# Patient Record
Sex: FEMALE | Race: WHITE | NOT HISPANIC OR LATINO | Employment: OTHER | URBAN - NONMETROPOLITAN AREA
[De-identification: names, ages, dates, MRNs, and addresses within clinical notes are randomized per-mention and may not be internally consistent; named-entity substitution may affect disease eponyms.]

---

## 2017-09-19 DIAGNOSIS — M85.80 OSTEOPENIA: Primary | ICD-10-CM

## 2017-09-22 DIAGNOSIS — Z78.0 POSTMENOPAUSAL: Primary | ICD-10-CM

## 2017-09-22 DIAGNOSIS — Z78.0 POSTMENOPAUSAL STATUS: Primary | ICD-10-CM

## 2017-09-27 ENCOUNTER — HOSPITAL ENCOUNTER (OUTPATIENT)
Dept: GENERAL RADIOLOGY | Facility: HOSPITAL | Age: 67
Discharge: HOME | End: 2017-09-27
Attending: FAMILY MEDICINE | Admitting: FAMILY MEDICINE
Payer: MEDICARE

## 2017-09-27 PROCEDURE — 77080 DXA BONE DENSITY AXIAL: CPT | Mod: TC

## 2019-11-19 NOTE — PROGRESS NOTES
Subjective     Gypsy Blackburn is a 69 year old female who presents to clinic today for the following health issues:    HPI   New Patient/Transfer of Care: Followed with Dr. Issa Up with last appointment in 9/19/2017  Hyperlipidemia Follow-Up      Are you having any of the following symptoms? (Select all that apply)  Left-sided neck or arm pain and Chest pain or pressure    Are you regularly taking any medication or supplement to lower your cholesterol?   Yes- red rice    Are you having muscle aches or other side effects that you think could be caused by your cholesterol lowering medication?  No    - currently having chest pain  - duration of couple of years  - Life Screening - blockage in her carotids  - stress test in 2013 which showed no reversible ischemia   - admitted to hospital for chest pain and was told she had a heart attack, but could not pinpoint when   - chest pain will wake her up at night   - pain goes into her left arm  - pain not worse with activity   - no leg pain  - FHx of heart issues w/ mother and brother (MI 50s)        How many servings of fruits and vegetables do you eat daily?  2-3    On average, how many sweetened beverages do you drink each day (soda, juice, sweet tea, etc)?   0    How many days per week do you miss taking your medication? 0    Diarrhea      Duration: 3 weeks, was sick. Took vitamin C tablet and resulting in diarrhea    Description:       Consistency of stool: watery, explosive and black       Blood in stool: no        Number of loose stools past 24 hours: None, no issues in the last 4 days.     Intensity:  severe    Accompanying signs and symptoms:       Fever: no        Nausea/vomitting: no        Abdominal pain: no        Weight loss: YES- only a pound or 2    History (recent antibiotics or travel/ill contacts/med changes/testing done): none    Precipitating or alleviating factors: None    Therapies tried and outcome: PeptoBismol DID NOT HELP  - doing BRAT diet  and increasing from there     Patient Active Problem List   Diagnosis     HCD (health care directive)     Family history of heart disease     Lumbago     Osteoarthrosis involving lower leg     Hyperlipidemia LDL goal <100     Plantar fasciitis     Past Surgical History:   Procedure Laterality Date     APPENDECTOMY  1964     ARTHROSCOPY KNEE Left 1989     HYSTERECTOMY  1974     HYSTERECTOMY TOTAL ABDOMINAL  2001     TONSILLECTOMY  1954       Social History     Tobacco Use     Smoking status: Never Smoker     Smokeless tobacco: Never Used   Substance Use Topics     Alcohol use: Not Currently     Comment: former     Family History   Problem Relation Age of Onset     Myocardial Infarction Mother      Alcoholism Father         Shot deer hunting     Mental Illness Brother      Myocardial Infarction Brother      Myocardial Infarction Brother      Myocardial Infarction Brother      Myocardial Infarction Brother          Current Outpatient Medications   Medication Sig Dispense Refill     aspirin (ASA) 81 MG EC tablet Take 81 mg by mouth       B Complex-Biotin-FA (SUPER B-COMPLEX) TABS        CALCIUM PO Take 600 mg by mouth       Cholecalciferol (VITAMIN D3) 50 MCG (2000 UT) CAPS Takes 1 once a day       magnesium 250 MG tablet Take 1 tablet by mouth daily       red yeast rice 600 MG CAPS Take 1,200 mg by mouth       EPINEPHrine (EPIPEN/ADRENACLICK/OR ANY BX GENERIC EQUIV) 0.3 MG/0.3ML injection 2-pack 1 TIME ONLY       Allergies   Allergen Reactions     Bee Venom      unknown reaction     Iodine      unknown reaction     Penicillins      Childhood     Etodolac Rash     BP Readings from Last 3 Encounters:   11/22/19 139/60   11/22/19 102/74    Wt Readings from Last 3 Encounters:   11/22/19 61.7 kg (136 lb)   11/22/19 62 kg (136 lb 9.6 oz)            Reviewed and updated as needed this visit by Provider  Tobacco  Allergies  Meds  Problems  Med Hx  Surg Hx  Fam Hx  Soc Hx          Review of Systems   Constitutional:  Negative for chills and fever.   HENT: Negative for congestion, ear pain, rhinorrhea and sore throat.    Eyes: Negative for pain.   Respiratory: Negative for cough and shortness of breath.    Cardiovascular: Positive for chest pain. Negative for palpitations.   Gastrointestinal: Negative for abdominal pain, constipation, diarrhea, hematochezia, nausea and vomiting.   Genitourinary: Negative for dysuria, frequency and hematuria.   Musculoskeletal: Negative for arthralgias, joint swelling and myalgias.   Skin: Negative for rash.   Neurological: Negative for dizziness, light-headedness and headaches.   Hematological: Does not bruise/bleed easily.   Psychiatric/Behavioral: Negative for mood changes. The patient is not nervous/anxious.          Objective    /74 (BP Location: Left arm, Patient Position: Sitting, Cuff Size: Adult Regular)   Pulse 72   Temp 97.8  F (36.6  C) (Tympanic)   Resp 20   Wt 62 kg (136 lb 9.6 oz)   SpO2 97%   There is no height or weight on file to calculate BMI.  Physical Exam  Constitutional:       General: She is not in acute distress.     Appearance: She is well-developed.   HENT:      Head: Normocephalic and atraumatic.      Right Ear: Hearing and tympanic membrane normal.      Left Ear: Hearing and tympanic membrane normal.      Mouth/Throat:      Mouth: Mucous membranes are moist.      Pharynx: No oropharyngeal exudate.   Eyes:      Extraocular Movements: Extraocular movements intact.      Pupils: Pupils are equal, round, and reactive to light.   Neck:      Musculoskeletal: Normal range of motion and neck supple.      Thyroid: No thyromegaly.   Cardiovascular:      Rate and Rhythm: Normal rate and regular rhythm.      Pulses: Normal pulses.      Heart sounds: Normal heart sounds. No murmur.   Pulmonary:      Effort: Pulmonary effort is normal. No respiratory distress.      Breath sounds: Normal breath sounds. No wheezing or rales.   Chest:      Chest wall: Tenderness (Upper left )  present.   Abdominal:      General: Bowel sounds are normal. There is no distension.      Palpations: Abdomen is soft.      Tenderness: There is abdominal tenderness in the epigastric area. There is no guarding.   Musculoskeletal: Normal range of motion.   Lymphadenopathy:      Cervical: No cervical adenopathy.   Skin:     General: Skin is dry.   Neurological:      Mental Status: She is alert.      Deep Tendon Reflexes: Reflexes normal.   Psychiatric:         Mood and Affect: Mood normal.        Diagnostic Test Results:  Results for orders placed or performed in visit on 11/22/19 (from the past 24 hour(s))   CBC with platelets differential   Result Value Ref Range    WBC 5.8 4.0 - 11.0 10e9/L    RBC Count 4.23 3.8 - 5.2 10e12/L    Hemoglobin 13.2 11.7 - 15.7 g/dL    Hematocrit 38.4 35.0 - 47.0 %    MCV 91 78 - 100 fl    MCH 31.2 26.5 - 33.0 pg    MCHC 34.4 31.5 - 36.5 g/dL    RDW 11.9 10.0 - 15.0 %    Platelet Count 270 150 - 450 10e9/L    Diff Method Automated Method     % Neutrophils 48.0 %    % Lymphocytes 38.0 %    % Monocytes 12.0 %    % Eosinophils 1.6 %    % Basophils 0.2 %    % Immature Granulocytes 0.2 %    Nucleated RBCs 0 0 /100    Absolute Neutrophil 2.8 1.6 - 8.3 10e9/L    Absolute Lymphocytes 2.2 0.8 - 5.3 10e9/L    Absolute Monocytes 0.7 0.0 - 1.3 10e9/L    Absolute Eosinophils 0.1 0.0 - 0.7 10e9/L    Absolute Basophils 0.0 0.0 - 0.2 10e9/L    Abs Immature Granulocytes 0.0 0 - 0.4 10e9/L    Absolute Nucleated RBC 0.0    Comprehensive metabolic panel   Result Value Ref Range    Sodium 141 133 - 144 mmol/L    Potassium 4.0 3.4 - 5.3 mmol/L    Chloride 107 94 - 109 mmol/L    Carbon Dioxide 29 20 - 32 mmol/L    Anion Gap 5 3 - 14 mmol/L    Glucose 92 70 - 99 mg/dL    Urea Nitrogen 16 7 - 30 mg/dL    Creatinine 0.97 0.52 - 1.04 mg/dL    GFR Estimate 60 (L) >60 mL/min/[1.73_m2]    GFR Estimate If Black 70 >60 mL/min/[1.73_m2]    Calcium 9.2 8.5 - 10.1 mg/dL    Bilirubin Total 0.7 0.2 - 1.3 mg/dL    Albumin  4.0 3.4 - 5.0 g/dL    Protein Total 6.8 6.8 - 8.8 g/dL    Alkaline Phosphatase 88 40 - 150 U/L    ALT 28 0 - 50 U/L    AST 20 0 - 45 U/L   TSH with free T4 reflex   Result Value Ref Range    TSH 1.81 0.40 - 4.00 mU/L   Troponin I   Result Value Ref Range    Troponin I ES <0.015 0.000 - 0.045 ug/L         Recent Labs   Lab Test 11/22/19  1220   CHOL 217*   HDL 49*   *   TRIG 91         EKG: LBBB, also on 9/17/2013 EKG read. Does not meet Sgarbossa criteria     Assessment & Plan     1. Chest pain, unspecified type: Consistent with unstable angina   Significant FHx of heart issues w/ brother MIs in their 50s. EKG showed LBBB which was also shown on EKG 9/2013. Pain wakes her up at night. Limited interactions with healthcare. High pretest probability. Troponin was negative, but high level of suspicion for cardiac etiology, will send to ER for further work up.   - EKG 12-lead complete w/read - (Clinic Performed)  - Troponin I  - CARDIOLOGY EVAL ADULT REFERRAL  - CXR in ED    2. Hyperlipidemia LDL goal <100  - Lipid Profile, ASCVD risk 4.8%    3. Diarrhea, unspecified type  Resolved. Gyspy is declining a colonoscopy. The dark stools may be d/t pepto bismol     4. Need for prophylactic vaccination and inoculation against influenza  - INFLUENZA (HIGH DOSE) 3 VALENT VACCINE [01190]  - ADMIN INFLUENZA (For MEDICARE Patients ONLY) []  - ADMIN PNEUMOVAX VACCINE (For MEDICARE Patients ONLY) []  - Vaccine Administration, Initial [18794]  - Pneumococcal vaccine 23 valent PPSV23  (Pneumovax) [77281]    5. Encounter for routine adult medical exam with abnormal findings  - CBC with platelets differential  - Comprehensive metabolic panel  - TSH with free T4 reflex    # Wellness:  - colonoscopy: declining colonoscopy, will do cologuard. Discussed if positive, will need to do a colonoscopy. Gypsy is in agreement   -  Mammogram: declines   - pap: aged out and hysterectomy     See Patient Instructions    Follow up in  3 months    Kaylen Hawkins MD  [facility] - [place]

## 2019-11-22 ENCOUNTER — OFFICE VISIT (OUTPATIENT)
Dept: FAMILY MEDICINE | Facility: OTHER | Age: 69
End: 2019-11-22
Attending: FAMILY MEDICINE
Payer: MEDICARE

## 2019-11-22 ENCOUNTER — HOSPITAL ENCOUNTER (EMERGENCY)
Facility: HOSPITAL | Age: 69
Discharge: HOME | End: 2019-11-22
Attending: PHYSICIAN ASSISTANT | Admitting: PHYSICIAN ASSISTANT
Payer: MEDICARE

## 2019-11-22 ENCOUNTER — APPOINTMENT (OUTPATIENT)
Dept: CT IMAGING | Facility: HOSPITAL | Age: 69
End: 2019-11-22
Attending: PHYSICIAN ASSISTANT
Payer: MEDICARE

## 2019-11-22 VITALS
TEMPERATURE: 98.4 F | RESPIRATION RATE: 18 BRPM | HEART RATE: 59 BPM | DIASTOLIC BLOOD PRESSURE: 90 MMHG | SYSTOLIC BLOOD PRESSURE: 108 MMHG | WEIGHT: 136 LBS | OXYGEN SATURATION: 97 %

## 2019-11-22 VITALS
DIASTOLIC BLOOD PRESSURE: 74 MMHG | TEMPERATURE: 97.8 F | SYSTOLIC BLOOD PRESSURE: 102 MMHG | WEIGHT: 136.6 LBS | OXYGEN SATURATION: 97 % | HEART RATE: 72 BPM | RESPIRATION RATE: 20 BRPM

## 2019-11-22 DIAGNOSIS — Z00.01 ENCOUNTER FOR ROUTINE ADULT MEDICAL EXAM WITH ABNORMAL FINDINGS: Primary | ICD-10-CM

## 2019-11-22 DIAGNOSIS — R19.7 DIARRHEA, UNSPECIFIED TYPE: ICD-10-CM

## 2019-11-22 DIAGNOSIS — E78.5 HYPERLIPIDEMIA LDL GOAL <100: ICD-10-CM

## 2019-11-22 DIAGNOSIS — R07.9 CHEST PAIN: ICD-10-CM

## 2019-11-22 DIAGNOSIS — Z23 NEED FOR PROPHYLACTIC VACCINATION AND INOCULATION AGAINST INFLUENZA: ICD-10-CM

## 2019-11-22 DIAGNOSIS — R07.9 CHEST PAIN, UNSPECIFIED TYPE: ICD-10-CM

## 2019-11-22 DIAGNOSIS — R07.89 ATYPICAL CHEST PAIN: ICD-10-CM

## 2019-11-22 LAB
ALBUMIN SERPL-MCNC: 4 G/DL (ref 3.4–5)
ALP SERPL-CCNC: 88 U/L (ref 40–150)
ALT SERPL W P-5'-P-CCNC: 28 U/L (ref 0–50)
ANION GAP SERPL CALCULATED.3IONS-SCNC: 5 MMOL/L (ref 3–14)
AST SERPL W P-5'-P-CCNC: 20 U/L (ref 0–45)
BASOPHILS # BLD AUTO: 0 10E9/L (ref 0–0.2)
BASOPHILS NFR BLD AUTO: 0.2 %
BILIRUB SERPL-MCNC: 0.7 MG/DL (ref 0.2–1.3)
BUN SERPL-MCNC: 16 MG/DL (ref 7–30)
CALCIUM SERPL-MCNC: 9.2 MG/DL (ref 8.5–10.1)
CHLORIDE SERPL-SCNC: 107 MMOL/L (ref 94–109)
CHOLEST SERPL-MCNC: 217 MG/DL
CO2 SERPL-SCNC: 29 MMOL/L (ref 20–32)
CREAT SERPL-MCNC: 0.97 MG/DL (ref 0.52–1.04)
DIFFERENTIAL METHOD BLD: NORMAL
EOSINOPHIL # BLD AUTO: 0.1 10E9/L (ref 0–0.7)
EOSINOPHIL NFR BLD AUTO: 1.6 %
ERYTHROCYTE [DISTWIDTH] IN BLOOD BY AUTOMATED COUNT: 11.9 % (ref 10–15)
GFR SERPL CREATININE-BSD FRML MDRD: 60 ML/MIN/{1.73_M2}
GLUCOSE SERPL-MCNC: 92 MG/DL (ref 70–99)
HCT VFR BLD AUTO: 38.4 % (ref 35–47)
HDLC SERPL-MCNC: 49 MG/DL
HGB BLD-MCNC: 13.2 G/DL (ref 11.7–15.7)
IMM GRANULOCYTES # BLD: 0 10E9/L (ref 0–0.4)
IMM GRANULOCYTES NFR BLD: 0.2 %
LDLC SERPL CALC-MCNC: 150 MG/DL
LYMPHOCYTES # BLD AUTO: 2.2 10E9/L (ref 0.8–5.3)
LYMPHOCYTES NFR BLD AUTO: 38 %
MCH RBC QN AUTO: 31.2 PG (ref 26.5–33)
MCHC RBC AUTO-ENTMCNC: 34.4 G/DL (ref 31.5–36.5)
MCV RBC AUTO: 91 FL (ref 78–100)
MONOCYTES # BLD AUTO: 0.7 10E9/L (ref 0–1.3)
MONOCYTES NFR BLD AUTO: 12 %
NEUTROPHILS # BLD AUTO: 2.8 10E9/L (ref 1.6–8.3)
NEUTROPHILS NFR BLD AUTO: 48 %
NONHDLC SERPL-MCNC: 168 MG/DL
NRBC # BLD AUTO: 0 10*3/UL
NRBC BLD AUTO-RTO: 0 /100
PLATELET # BLD AUTO: 270 10E9/L (ref 150–450)
POTASSIUM SERPL-SCNC: 4 MMOL/L (ref 3.4–5.3)
PROT SERPL-MCNC: 6.8 G/DL (ref 6.8–8.8)
RBC # BLD AUTO: 4.23 10E12/L (ref 3.8–5.2)
SODIUM SERPL-SCNC: 141 MMOL/L (ref 133–144)
TRIGL SERPL-MCNC: 91 MG/DL
TROPONIN I SERPL-MCNC: <0.015 UG/L (ref 0–0.04)
TROPONIN I SERPL-MCNC: <0.015 UG/L (ref 0–0.04)
TSH SERPL DL<=0.005 MIU/L-ACNC: 1.81 MU/L (ref 0.4–4)
WBC # BLD AUTO: 5.8 10E9/L (ref 4–11)

## 2019-11-22 PROCEDURE — 96374 THER/PROPH/DIAG INJ IV PUSH: CPT

## 2019-11-22 PROCEDURE — 84484 ASSAY OF TROPONIN QUANT: CPT | Mod: 91 | Performed by: PHYSICIAN ASSISTANT

## 2019-11-22 PROCEDURE — 99285 EMERGENCY DEPT VISIT HI MDM: CPT | Mod: 25

## 2019-11-22 PROCEDURE — 80061 LIPID PANEL: CPT | Mod: ZL | Performed by: FAMILY MEDICINE

## 2019-11-22 PROCEDURE — G0463 HOSPITAL OUTPT CLINIC VISIT: HCPCS | Mod: 25

## 2019-11-22 PROCEDURE — 90662 IIV NO PRSV INCREASED AG IM: CPT

## 2019-11-22 PROCEDURE — 90732 PPSV23 VACC 2 YRS+ SUBQ/IM: CPT

## 2019-11-22 PROCEDURE — G0009 ADMIN PNEUMOCOCCAL VACCINE: HCPCS | Performed by: FAMILY MEDICINE

## 2019-11-22 PROCEDURE — 84443 ASSAY THYROID STIM HORMONE: CPT | Mod: ZL | Performed by: FAMILY MEDICINE

## 2019-11-22 PROCEDURE — 84484 ASSAY OF TROPONIN QUANT: CPT | Mod: ZL | Performed by: FAMILY MEDICINE

## 2019-11-22 PROCEDURE — 80053 COMPREHEN METABOLIC PANEL: CPT | Mod: ZL | Performed by: FAMILY MEDICINE

## 2019-11-22 PROCEDURE — 85025 COMPLETE CBC W/AUTO DIFF WBC: CPT | Mod: ZL | Performed by: FAMILY MEDICINE

## 2019-11-22 PROCEDURE — 25500064 ZZH RX 255 OP 636: Performed by: RADIOLOGY

## 2019-11-22 PROCEDURE — 90471 IMMUNIZATION ADMIN: CPT | Performed by: FAMILY MEDICINE

## 2019-11-22 PROCEDURE — 93005 ELECTROCARDIOGRAM TRACING: CPT | Mod: XU

## 2019-11-22 PROCEDURE — 93010 ELECTROCARDIOGRAM REPORT: CPT | Performed by: INTERNAL MEDICINE

## 2019-11-22 PROCEDURE — 25000128 H RX IP 250 OP 636: Performed by: PHYSICIAN ASSISTANT

## 2019-11-22 PROCEDURE — 25000132 ZZH RX MED GY IP 250 OP 250 PS 637: Mod: GY

## 2019-11-22 PROCEDURE — 25000125 ZZHC RX 250: Performed by: PHYSICIAN ASSISTANT

## 2019-11-22 PROCEDURE — 99204 OFFICE O/P NEW MOD 45 MIN: CPT | Mod: 25 | Performed by: FAMILY MEDICINE

## 2019-11-22 PROCEDURE — 99284 EMERGENCY DEPT VISIT MOD MDM: CPT | Mod: Z6 | Performed by: PHYSICIAN ASSISTANT

## 2019-11-22 PROCEDURE — G0008 ADMIN INFLUENZA VIRUS VAC: HCPCS | Performed by: FAMILY MEDICINE

## 2019-11-22 PROCEDURE — 75574 CT ANGIO HRT W/3D IMAGE: CPT | Mod: TC

## 2019-11-22 PROCEDURE — 36415 COLL VENOUS BLD VENIPUNCTURE: CPT | Mod: ZL | Performed by: FAMILY MEDICINE

## 2019-11-22 PROCEDURE — 96375 TX/PRO/DX INJ NEW DRUG ADDON: CPT | Mod: XU

## 2019-11-22 RX ORDER — METOPROLOL TARTRATE 50 MG
50-100 TABLET ORAL
Status: CANCELLED | OUTPATIENT
Start: 2019-11-22

## 2019-11-22 RX ORDER — NITROGLYCERIN 0.4 MG/1
0.4 TABLET SUBLINGUAL
Status: DISCONTINUED | OUTPATIENT
Start: 2019-11-22 | End: 2019-11-22 | Stop reason: HOSPADM

## 2019-11-22 RX ORDER — NITROGLYCERIN 0.4 MG/1
0.4 TABLET SUBLINGUAL
Status: CANCELLED | OUTPATIENT
Start: 2019-11-22

## 2019-11-22 RX ORDER — METOPROLOL TARTRATE 1 MG/ML
5-15 INJECTION, SOLUTION INTRAVENOUS
Status: CANCELLED | OUTPATIENT
Start: 2019-11-22

## 2019-11-22 RX ORDER — METHYLPREDNISOLONE SODIUM SUCCINATE 125 MG/2ML
125 INJECTION, POWDER, LYOPHILIZED, FOR SOLUTION INTRAMUSCULAR; INTRAVENOUS ONCE
Status: COMPLETED | OUTPATIENT
Start: 2019-11-22 | End: 2019-11-22

## 2019-11-22 RX ORDER — DIPHENHYDRAMINE HYDROCHLORIDE 50 MG/ML
50 INJECTION INTRAMUSCULAR; INTRAVENOUS ONCE
Status: COMPLETED | OUTPATIENT
Start: 2019-11-22 | End: 2019-11-22

## 2019-11-22 RX ORDER — MULTIVITAMIN WITH IRON
1 TABLET ORAL DAILY
COMMUNITY
End: 2020-10-07

## 2019-11-22 RX ORDER — METOPROLOL TARTRATE 50 MG
50-100 TABLET ORAL
Status: DISCONTINUED | OUTPATIENT
Start: 2019-11-22 | End: 2019-11-22 | Stop reason: HOSPADM

## 2019-11-22 RX ORDER — EPINEPHRINE 0.3 MG/.3ML
INJECTION SUBCUTANEOUS
COMMUNITY
Start: 2014-09-23 | End: 2019-11-22

## 2019-11-22 RX ORDER — METOPROLOL TARTRATE 1 MG/ML
5 INJECTION, SOLUTION INTRAVENOUS ONCE
Status: COMPLETED | OUTPATIENT
Start: 2019-11-22 | End: 2019-11-22

## 2019-11-22 RX ORDER — NITROGLYCERIN 0.4 MG/1
TABLET SUBLINGUAL
Status: COMPLETED
Start: 2019-11-22 | End: 2019-11-22

## 2019-11-22 RX ORDER — METOPROLOL TARTRATE 1 MG/ML
5-15 INJECTION, SOLUTION INTRAVENOUS
Status: DISCONTINUED | OUTPATIENT
Start: 2019-11-22 | End: 2019-11-22 | Stop reason: HOSPADM

## 2019-11-22 RX ORDER — AMPICILLIN TRIHYDRATE 250 MG
600 CAPSULE ORAL DAILY
COMMUNITY
Start: 2013-12-05 | End: 2020-10-07

## 2019-11-22 RX ADMIN — DIPHENHYDRAMINE HYDROCHLORIDE 50 MG: 50 INJECTION, SOLUTION INTRAMUSCULAR; INTRAVENOUS at 12:28

## 2019-11-22 RX ADMIN — IOHEXOL 50 ML: 350 INJECTION, SOLUTION INTRAVENOUS at 13:38

## 2019-11-22 RX ADMIN — NITROGLYCERIN 0.4 MG: 0.4 TABLET SUBLINGUAL at 13:31

## 2019-11-22 RX ADMIN — METHYLPREDNISOLONE SODIUM SUCCINATE 125 MG: 125 INJECTION, POWDER, FOR SOLUTION INTRAMUSCULAR; INTRAVENOUS at 12:26

## 2019-11-22 RX ADMIN — METOPROLOL TARTRATE 5 MG: 1 INJECTION, SOLUTION INTRAVENOUS at 12:31

## 2019-11-22 ASSESSMENT — ENCOUNTER SYMPTOMS
CONSTIPATION: 0
RHINORRHEA: 0
NERVOUS/ANXIOUS: 0
FEVER: 0
BRUISES/BLEEDS EASILY: 0
APPETITE CHANGE: 0
DIZZINESS: 0
LIGHT-HEADEDNESS: 0
DIZZINESS: 0
COUGH: 0
CARDIOVASCULAR NEGATIVE: 1
FREQUENCY: 0
DYSURIA: 0
FATIGUE: 0
ABDOMINAL PAIN: 0
VOMITING: 0
CHILLS: 0
ACTIVITY CHANGE: 0
BRUISES/BLEEDS EASILY: 0
HEMATOCHEZIA: 0
BACK PAIN: 0
COUGH: 0
ARTHRALGIAS: 0
PALPITATIONS: 0
NAUSEA: 0
SORE THROAT: 0
EYE PAIN: 0
HEMATURIA: 0
LIGHT-HEADEDNESS: 0
SHORTNESS OF BREATH: 0
JOINT SWELLING: 0
SHORTNESS OF BREATH: 0
VOMITING: 0
ABDOMINAL PAIN: 0
DIARRHEA: 0
CHEST TIGHTNESS: 0
HEADACHES: 0
HEADACHES: 0
CHILLS: 0
FEVER: 0
NAUSEA: 0
MYALGIAS: 0
WEAKNESS: 0

## 2019-11-22 ASSESSMENT — ANXIETY QUESTIONNAIRES
GAD7 TOTAL SCORE: 7
6. BECOMING EASILY ANNOYED OR IRRITABLE: SEVERAL DAYS
3. WORRYING TOO MUCH ABOUT DIFFERENT THINGS: SEVERAL DAYS
7. FEELING AFRAID AS IF SOMETHING AWFUL MIGHT HAPPEN: SEVERAL DAYS
1. FEELING NERVOUS, ANXIOUS, OR ON EDGE: SEVERAL DAYS
2. NOT BEING ABLE TO STOP OR CONTROL WORRYING: SEVERAL DAYS
5. BEING SO RESTLESS THAT IT IS HARD TO SIT STILL: SEVERAL DAYS
IF YOU CHECKED OFF ANY PROBLEMS ON THIS QUESTIONNAIRE, HOW DIFFICULT HAVE THESE PROBLEMS MADE IT FOR YOU TO DO YOUR WORK, TAKE CARE OF THINGS AT HOME, OR GET ALONG WITH OTHER PEOPLE: SOMEWHAT DIFFICULT

## 2019-11-22 ASSESSMENT — PATIENT HEALTH QUESTIONNAIRE - PHQ9
SUM OF ALL RESPONSES TO PHQ QUESTIONS 1-9: 6
5. POOR APPETITE OR OVEREATING: SEVERAL DAYS

## 2019-11-22 ASSESSMENT — PAIN SCALES - GENERAL: PAINLEVEL: NO PAIN (0)

## 2019-11-22 NOTE — ED TRIAGE NOTES
"Pt states she has been having left chest pain off and on for months. States at times it radiates to left bicep \"it wakes me up at times\". States no radiation at this time. Chest pain 2/10 and doesn't change with respirations. States came from clinic after EKG changes.  "

## 2019-11-22 NOTE — ED PROVIDER NOTES
"  History     Chief Complaint   Patient presents with     Abnormal Ekg     Chest Pain     The history is provided by the patient.     Gypsy Blackburn is a 69 year old female who presented to the emergency department ambulatory from the clinic for evaluation of ongoing chest pain.  Patient was seen today to establish care and was noted to have at least 2 months of daily left anterior chest pain that worsened with palpation.  The pain would wake her up in the middle of the night and significantly worsened with \"pressing\" on the area.  Describes just left of the sternum with some radiation to the left bicep.  Sharp in nature.  Reports that she was out shoveling snow for 3-1/2 hours yesterday without any significant pain.  Denies any diaphoresis, nausea, or dyspnea with exertion.  She does have a strong family history of coronary artery disease.  The patient does not smoke.  She does not have diabetes.  She does not have elevated cholesterol.  She is not obese.  Describes that she has pain \"every single day.\"    Allergies:  Allergies   Allergen Reactions     Bee Venom      unknown reaction     Iodine      unknown reaction     Penicillins      Childhood     Etodolac Rash       Problem List:    Patient Active Problem List    Diagnosis Date Noted     Family history of heart disease 09/29/2015     Priority: Medium     Osteoarthrosis involving lower leg 09/29/2015     Priority: Medium     Overview:   Medial knee DJD  IMO Update       HCD (health care directive) 10/11/2013     Priority: Medium     Plantar fasciitis 08/29/2013     Priority: Medium     Hyperlipidemia LDL goal <100 11/23/2012     Priority: Medium     Lumbago 12/21/2006     Priority: Medium     Overview:   IMO Update 10/11          Past Medical History:    No past medical history on file.    Past Surgical History:    Past Surgical History:   Procedure Laterality Date     APPENDECTOMY  1964     ARTHROSCOPY KNEE Left 1989     HYSTERECTOMY  1974     HYSTERECTOMY " TOTAL ABDOMINAL  2001     TONSILLECTOMY  1954       Family History:    Family History   Problem Relation Age of Onset     Myocardial Infarction Mother      Alcoholism Father         Shot deer hunting     Mental Illness Brother      Myocardial Infarction Brother      Myocardial Infarction Brother      Myocardial Infarction Brother      Myocardial Infarction Brother        Social History:  Marital Status:   [4]  Social History     Tobacco Use     Smoking status: Never Smoker     Smokeless tobacco: Never Used   Substance Use Topics     Alcohol use: Not Currently     Comment: former     Drug use: Never        Medications:    aspirin (ASA) 81 MG EC tablet  B Complex-Biotin-FA (SUPER B-COMPLEX) TABS  CALCIUM PO  Cholecalciferol (VITAMIN D3) 50 MCG (2000 UT) CAPS  magnesium 250 MG tablet  red yeast rice 600 MG CAPS          Review of Systems   Constitutional: Negative for activity change, appetite change, chills, fatigue and fever.   Respiratory: Negative for cough, chest tightness and shortness of breath.    Cardiovascular: Negative.    Gastrointestinal: Negative for abdominal pain, nausea and vomiting.   Genitourinary: Negative.    Musculoskeletal: Negative for back pain.   Skin: Negative.    Neurological: Negative for dizziness, weakness, light-headedness and headaches.   Hematological: Does not bruise/bleed easily.       Physical Exam   BP: 139/60  Pulse: 73  Heart Rate: 71  Temp: 97.9  F (36.6  C)  Resp: 16  Weight: 61.7 kg (136 lb)  SpO2: 100 %      Physical Exam  Vitals signs and nursing note reviewed.   Constitutional:       General: She is not in acute distress.     Appearance: Normal appearance. She is normal weight. She is not ill-appearing, toxic-appearing or diaphoretic.   Neck:      Musculoskeletal: Normal range of motion and neck supple.   Cardiovascular:      Rate and Rhythm: Normal rate and regular rhythm.      Pulses: Normal pulses.      Heart sounds: Normal heart sounds.   Pulmonary:      Effort:  Pulmonary effort is normal.      Breath sounds: Normal breath sounds.   Abdominal:      General: Abdomen is flat.   Skin:     General: Skin is warm and dry.      Capillary Refill: Capillary refill takes less than 2 seconds.   Neurological:      General: No focal deficit present.      Mental Status: She is alert and oriented to person, place, and time. Mental status is at baseline.   Psychiatric:         Mood and Affect: Mood normal.         ED Course        Procedures  EKG shows a normal sinus rhythm at a rate of 62.  Normal ND interval.  Prolonged QRS duration of 150 ms.  Normal QTC.  There is a left bundle.  I could not visualize the EKG from 2013 but dictated note shows left bundle branch block at that time.  Normal discordance.             Critical Care time:  none               Results for orders placed or performed during the hospital encounter of 11/22/19 (from the past 24 hour(s))   Troponin I   Result Value Ref Range    Troponin I ES <0.015 0.000 - 0.045 ug/L   CTA Angiogram coronary artery    Narrative    PROCEDURE: CTA ANGIOGRAM CORONARY ARTERY 11/22/2019 2:55 PM    HISTORY: Chest pain    COMPARISONS: None.    Meds/Dose Given: Omnipaque 350 100mL    TECHNIQUE: CT scan of the heart without contrast with calcium scoring  CT coronary angiogram with three-dimensional MIPS reconstructions  performed on a separate workstation.    FINDINGS: CT coronary calcium scoring was performed. Using the  Agatston method the CT calcium score was 267 place the patient at  moderate risk of significant coronary artery disease.    CT coronary angiogram was performed. The right coronary artery and  marginal branches appear normal. There is atherosclerotic plaquing at  the left main coronary artery without significant stenosis. The left  circumflex and marginal branches are widely patent. There is extremely  heavy calcific plaquing in the proximal left anterior descending  artery. Because of the dense calcifications the degree of  stenosis  could not be determined.  More distally the left anterior descending  and diagonal branches appear normal.    The heart is normal in size. There is a 2 cm diameter right hilar  lymph node which is borderline enlarged. The subcarinal lymph nodes  are normal in caliber. On series 8 axial image 21 is a 6 mm diameter  nodule in the right lower lobe. There is dependent atelectasis at the  lung bases. The upper portion of the liver and spleen appear normal.  There is a benign-appearing cysts seen in the left kidney.         Impression    IMPRESSION: Extremely dense calcification in the proximal left  anterior descending artery. The degree of stenosis cannot be  determined due to the dense calcifications. No other coronary artery  stenoses or occlusions are seen.    COOPER CRUZ MD       Medications   metoprolol (LOPRESSOR) injection 5 mg (5 mg Intravenous Given 11/22/19 1231)   methylPREDNISolone sodium succinate (solu-MEDROL) injection 125 mg (125 mg Intravenous Given 11/22/19 1226)   diphenhydrAMINE (BENADRYL) injection 50 mg (50 mg Intravenous Given 11/22/19 1228)   iohexol (OMNIPAQUE) 350 mg/mL solution 50 mL (50 mLs Intravenous Given 11/22/19 1338)   iohexol (OMNIPAQUE) 350 mg/mL solution 50 mL (50 mLs Intravenous Given 11/22/19 1338)   sodium chloride (PF) 0.9% PF flush 150 mL (160 mLs Intravenous Given 11/22/19 1338)       Assessments & Plan (with Medical Decision Making)   The patient's chest pain is very atypical in nature.  She reports that the pain has been present daily for 2 months.  Wakes her up middle the night.  Sharp in nature.  Worse with palpation at times.  She has no red flag associated signs or symptoms such as dyspnea, diaphoresis, or vomiting.  The patient reports that she shoveled snow for 3-1/2 hours yesterday without worsening pain.  CT Angio of the coronary arteries show dense calcification in the LAD but otherwise normal.  Given his dense calcification, stress test was ordered  for Wednesday, November 27.  She is otherwise healthy.  Case was also discussed with Dr. Lugo at [facility] cardiology who entirely agreed with discharge and outpatient follow-up.  See discharge instructions.  The patient voiced complete understanding and was happy and agreeable.    This document was prepared using a combination of typing and voice generated software.  While every attempt was made for accuracy, spelling and grammatical errors may exist.    I have reviewed the nursing notes.    I have reviewed the findings, diagnosis, plan and need for follow up with the patient.       New Prescriptions    No medications on file       Final diagnoses:   Atypical chest pain       11/22/2019   [facility]     Shaun Hood PA-C  11/22/19 1538

## 2019-11-22 NOTE — ED AVS SNAPSHOT
[facility]  [address] [address]  Phone:  [phone]                                    Gypsy Blackburn   MRN: [id number]    Department:  [facility]   Date of Visit:  11/22/2019           After Visit Summary Signature Page    I have received my discharge instructions, and my questions have been answered. I have discussed any challenges I see with this plan with the nurse or doctor.    ..........................................................................................................................................  Patient/Patient Representative Signature      ..........................................................................................................................................  Patient Representative Print Name and Relationship to Patient    ..................................................               ................................................  Date                                   Time    ..........................................................................................................................................  Reviewed by Signature/Title    ...................................................              ..............................................  Date                                               Time          22EPIC Rev 08/18

## 2019-11-22 NOTE — PATIENT INSTRUCTIONS
"Check with your insurance or pharmacist about the new shingles vaccine (Shingrix) - if it is covered and you wish to return for it you can make a nurse only visit. Remember it is 2 shots, the first at time \"Zero\" and the second 2-6 months later.     "

## 2019-11-22 NOTE — DISCHARGE INSTRUCTIONS
You have been scheduled for a stress test on Wednesday 11/27/19 at 7:15 am. No caffeine or smoking 24 hours prior to the study.  No lotion on chest or abdomen.  Nothing to eat or drink after midnight on the 26th.  Please return here for ANY worsening symptoms, new symptoms, or other concerns.

## 2019-11-22 NOTE — NURSING NOTE
Chief Complaint   Patient presents with     Establish Care       Initial /74 (BP Location: Left arm, Patient Position: Sitting, Cuff Size: Adult Regular)   Pulse 72   Temp 97.8  F (36.6  C) (Tympanic)   Resp 20   Wt 62 kg (136 lb 9.6 oz)   SpO2 97%  There is no height or weight on file to calculate BMI.  Medication Reconciliation: complete  Olesya Alexander LPN

## 2019-11-22 NOTE — PROGRESS NOTES
Procedure: CTA, NA    There were  no complications and patient has no symptoms..      Tolerated procedure well.    Radiologist:Dr Reveles    Time Out: Prior to the start of the procedure and with procedural staff participation, I verbally confirmed the patient s identity using two indicators, relevant allergies, that the procedure was appropriate and matched the consent or emergent situation, and that the correct equipment/implants were available. Immediately prior to starting the procedure I conducted the Time Out with the procedural staff and re-confirmed the patient s name, procedure, and site/side. (The Joint Commission universal protocol was followed.)  No    Position:  supine    Pain:  0    Sedation:none  No sedation    Estimated Blood Loss: None     Condition: Stable    Comments: See dictated procedure note for full details , One nitroglycerin 0.4 mg     DENISE SALAZAR RN

## 2019-11-23 ASSESSMENT — ANXIETY QUESTIONNAIRES: GAD7 TOTAL SCORE: 7

## 2019-11-25 ENCOUNTER — TELEPHONE (OUTPATIENT)
Dept: FAMILY MEDICINE | Facility: OTHER | Age: 69
End: 2019-11-25

## 2019-11-25 DIAGNOSIS — E78.5 HYPERLIPIDEMIA LDL GOAL <100: Primary | ICD-10-CM

## 2019-11-25 RX ORDER — ROSUVASTATIN CALCIUM 10 MG/1
10 TABLET, COATED ORAL DAILY
Qty: 30 TABLET | Refills: 1 | Status: SHIPPED | OUTPATIENT
Start: 2019-11-25 | End: 2020-09-10

## 2019-11-25 NOTE — TELEPHONE ENCOUNTER
Pt is wondering when she can get results from her lipid panel done on 11/22/19. Please return pt call.

## 2019-11-25 NOTE — TELEPHONE ENCOUNTER
I see on other results done on 11/22/19 they were discussed in the clinic.   What the lipid discussed in clinic?  If not, would you please result the lipid panel drawn on 11/22/19 and I will contact patient with results.  Klarissa Montoya, CMA

## 2019-11-25 NOTE — TELEPHONE ENCOUNTER
Left message for the patient to return phone call back to clinic at earliest convenience.  Klarissa Montoya, CMA

## 2019-11-25 NOTE — TELEPHONE ENCOUNTER
Please give Gypsy a call regarding her lipid panel:    Your cholesterol is 217 which above goal of 200. Your HDL (good cholesterol) is 49 which is at goal.  When we put your cholesterol panel and other health factors into a cardiac risk calculator, your risk of a cardiac event is 4.8%, but your CT of your coronary arteries did show plaque build up which I would recommend starting a cholesterol medication. I put a prescription in for Crestor 10mg

## 2019-11-27 ENCOUNTER — HOSPITAL ENCOUNTER (OUTPATIENT)
Dept: NUCLEAR MEDICINE | Facility: HOSPITAL | Age: 69
Setting detail: NUCLEAR MEDICINE
End: 2019-11-27
Attending: PHYSICIAN ASSISTANT
Payer: MEDICARE

## 2019-11-27 ENCOUNTER — HOSPITAL ENCOUNTER (OUTPATIENT)
Dept: NUCLEAR MEDICINE | Facility: HOSPITAL | Age: 69
Setting detail: NUCLEAR MEDICINE
Discharge: HOME | End: 2019-11-27
Attending: PHYSICIAN ASSISTANT | Admitting: PHYSICIAN ASSISTANT
Payer: MEDICARE

## 2019-11-27 ENCOUNTER — HOSPITAL ENCOUNTER (OUTPATIENT)
Dept: CARDIOLOGY | Facility: HOSPITAL | Age: 69
Setting detail: NUCLEAR MEDICINE
End: 2019-11-27
Attending: PHYSICIAN ASSISTANT
Payer: MEDICARE

## 2019-11-27 DIAGNOSIS — R07.9 CHEST PAIN: ICD-10-CM

## 2019-11-27 DIAGNOSIS — R07.89 ATYPICAL CHEST PAIN: ICD-10-CM

## 2019-11-27 LAB
CV STRESS MAX HR HE: 135
NUC STRESS EJECTION FRACTION: 69 %
RATE PRESSURE PRODUCT: NORMAL
STRESS ECHO BASELINE DIASTOLIC HE: 70
STRESS ECHO BASELINE HR: 78
STRESS ECHO BASELINE SYSTOLIC BP: 140
STRESS ECHO CALCULATED PERCENT HR: 88 %
STRESS ECHO LAST STRESS DIASTOLIC BP: 65
STRESS ECHO LAST STRESS SYSTOLIC BP: 155
STRESS ECHO POST ESTIMATED WORKLOAD: 7 METS
STRESS ECHO POST EXERCISE DUR MIN: 6 MIN
STRESS ECHO POST EXERCISE DUR SEC: 30 SEC
STRESS ECHO TARGET HR: 153

## 2019-11-27 PROCEDURE — A9500 TC99M SESTAMIBI: HCPCS | Performed by: RADIOLOGY

## 2019-11-27 PROCEDURE — 34300033 ZZH RX 343: Performed by: RADIOLOGY

## 2019-11-27 PROCEDURE — 93017 CV STRESS TEST TRACING ONLY: CPT | Performed by: INTERNAL MEDICINE

## 2019-11-27 PROCEDURE — 93016 CV STRESS TEST SUPVJ ONLY: CPT | Performed by: INTERNAL MEDICINE

## 2019-11-27 PROCEDURE — 93018 CV STRESS TEST I&R ONLY: CPT | Performed by: INTERNAL MEDICINE

## 2019-11-27 PROCEDURE — 78452 HT MUSCLE IMAGE SPECT MULT: CPT | Mod: TC

## 2019-11-27 PROCEDURE — 25800030 ZZH RX IP 258 OP 636: Performed by: INTERNAL MEDICINE

## 2019-11-27 RX ORDER — SODIUM CHLORIDE 9 MG/ML
INJECTION, SOLUTION INTRAVENOUS ONCE
Status: COMPLETED | OUTPATIENT
Start: 2019-11-27 | End: 2019-11-27

## 2019-11-27 RX ADMIN — Medication 30 MILLICURIE: at 09:58

## 2019-11-27 RX ADMIN — SODIUM CHLORIDE: 9 INJECTION, SOLUTION INTRAVENOUS at 09:44

## 2019-11-27 RX ADMIN — Medication 10 MILLICURIE: at 07:52

## 2019-12-12 ENCOUNTER — TELEPHONE (OUTPATIENT)
Dept: FAMILY MEDICINE | Facility: OTHER | Age: 69
End: 2019-12-12

## 2019-12-12 NOTE — TELEPHONE ENCOUNTER
Pt called, wondering if she still needs appt with cardiology if her stress test came back normal. Pt aware that PCP is out til Monday. Please advise. Thank you!

## 2019-12-16 NOTE — TELEPHONE ENCOUNTER
Per Dr Marina Hawkins, Kaylen Pitts MD  You;  Family Practice 1 hour ago (6:10 AM)      Yes, Gypsy does need an appointment with cardiology    Routing comment      Please call pt.

## 2019-12-17 ENCOUNTER — TELEPHONE (OUTPATIENT)
Dept: FAMILY MEDICINE | Facility: OTHER | Age: 69
End: 2019-12-17

## 2019-12-17 NOTE — TELEPHONE ENCOUNTER
Pt returning call regarding cardiology appt scheduled for 1-2-2020    Stress test was normal so pt cancelled cardiology appt.    Any questions or comments, please call pt.    Kaylen White LPN

## 2020-08-28 NOTE — PROGRESS NOTES
[facility] - [place]  [address]  [place] [address]  [phone]  Dept: [phone]    PRE-OP EVALUATION:  Today's date: 9/10/2020    Gypsy Blackburn (: 1950) presents for pre-operative evaluation assessment as requested by Dr. Dover.  She requires evaluation and anesthesia risk assessment prior to undergoing surgery/procedure for treatment of right and left cataracts.    Proposed Surgery/ Procedure: RIGHT EYE 9/15/20 and left eye on 2020  Date of Surgery/ Procedure: 09/15/2020  Time of Surgery/ Procedure: [facility]  Hospital/Surgical Facility: [facility]  Surgery Fax Number: [phone]  Primary Physician: Kaylen Hawkins  Type of Anesthesia Anticipated: to be determined    Preoperative Questionnaire:   Yes - Have you ever had a heart attack or stroke? Past EKG shows heart attack  No - Have you ever had surgery on your heart or blood vessels, such as a stent, coronary (heart) bypass, or surgery on an artery in the head, neck, heart, or legs?  No - Do you have chest pain when you are physically active?  No - Do you have a history of heart failure?  No - Do you currently have a cold, bronchitis, or symptoms of other respiratory (head and chest) infections?  No - Do you have a cough, shortness of breath, or wheezing?  No - Do you or anyone in your family have a history of blood clots?  No - Do you or anyone in your family have a serious bleeding problem, such as long-lasting bleeding after surgeries or cuts?  No - Have you ever had anemia or been told to take iron pills?  No - Have you had any abnormal blood loss such as black, tarry or bloody stools, or abnormal vaginal bleeding?  No - Have you ever had a blood transfusion?  Yes - Are you willing to have a blood transfusion if it is medically needed before, during, or after your surgery?  No - Have you or anyone in your family ever had problems with anesthesia (sedation for surgery)?  No - Do you have sleep apnea,  "excessive snoring, or daytime drowsiness?   No - Do you have any artifical heart valves or other implanted medical devices, such as a pacemaker, defibrillator, or continuous glucose monitor?  No - Do you have any artifical joints?  No- Are you allergic to latex?   No - Is there any chance that you may be pregnant?    Patient has a Health Care Directive or Living Will:  NO    HPI:     HPI related to upcoming procedure: Gypsy will be undergoing left and right cataract surgery.     See problem list for active medical problems.  Problems all longstanding and stable, except as noted/documented.  See ROS for pertinent symptoms related to these conditions.    HYPERLIPIDEMIA / h/o abnormal stress test - Patient has a long history of significant Hyperlipidemia requiring medication for treatment with recent fair control. Patient is not on a statin medication.   ASCVD risk 4.8%, but CTA coronary showed dense plaque in proximal LAD. NM stress test (11/27/2019): fixed apical and septal abnormality. No reversable ischemia. EF 69%, LBBB, unchanged from 2013  - crestor 10mg, not taking d/t \"never got the medication\"  - there was a miscommunication regarding previous cardiology referral, Gypsy did not refuse the consult.     # leg cramp: duration of 10 years  - taking potassium supplements  - stopped magnesium 6 months ago  - issues with staying hydrated       # Cardiovascular: Gypsy has shortness of breath and chest pain which is stable and unchanged. She has agreed to see cardiology and referral has been placed.     # Pulmonary: Never smoker    # Bleeding/Clotting risk: no personal or family h/o bleeding or clotting issues    # Previous surgical history: no issues with anesthesia      MEDICAL HISTORY:     Patient Active Problem List    Diagnosis Date Noted     Family history of heart disease 09/29/2015     Priority: Medium     Osteoarthrosis involving lower leg 09/29/2015     Priority: Medium     Overview:   Medial knee " DJD  IMO Update       HCD (health care directive) 10/11/2013     Priority: Medium     Plantar fasciitis 08/29/2013     Priority: Medium     Hyperlipidemia LDL goal <100 11/23/2012     Priority: Medium     Lumbago 12/21/2006     Priority: Medium     Overview:   IMO Update 10/11        No past medical history on file.  Past Surgical History:   Procedure Laterality Date     APPENDECTOMY  1964     ARTHROSCOPY KNEE Left 1989     HYSTERECTOMY  1974     HYSTERECTOMY TOTAL ABDOMINAL  2001     TONSILLECTOMY  1954     Current Outpatient Medications   Medication Sig Dispense Refill     aspirin (ASA) 81 MG EC tablet Take 81 mg by mouth daily        B Complex-Biotin-FA (SUPER B-COMPLEX) TABS Take by mouth daily        CALCIUM PO Take 600 mg by mouth daily        Cholecalciferol (VITAMIN D3) 50 MCG (2000 UT) CAPS daily        magnesium 250 MG tablet Take 1 tablet by mouth daily       red yeast rice 600 MG CAPS Take 600 mg by mouth daily        rosuvastatin (CRESTOR) 10 MG tablet Take 1 tablet (10 mg) by mouth daily 30 tablet 1     OTC products: NSAIDS    Allergies   Allergen Reactions     Bee Venom      unknown reaction     Iodine      unknown reaction     Penicillins      Childhood     Etodolac Rash      Latex Allergy: NO    Social History     Tobacco Use     Smoking status: Never Smoker     Smokeless tobacco: Never Used   Substance Use Topics     Alcohol use: Not Currently     Comment: former     History   Drug Use Unknown       REVIEW OF SYSTEMS:   CONSTITUTIONAL: NEGATIVE for fever, chills, change in weight  ENT/MOUTH: NEGATIVE for ear, mouth and throat problems  RESP: NEGATIVE for significant cough or SOB  CV: NEGATIVE for chest pain, palpitations or peripheral edema    EXAM:   There were no vitals taken for this visit.  GENERAL APPEARANCE: healthy, alert and no distress  HENT: ear canals and TM's normal and nose and mouth without ulcers or lesions  RESP: lungs clear to auscultation - no rales, rhonchi or wheezes  CV:  regular rate and rhythm, normal S1 S2, no S3 or S4 and no murmur, click or rub   ABDOMEN: soft, nontender, no HSM or masses and bowel sounds normal  NEURO: Normal strength and tone, sensory exam grossly normal, mentation intact and speech normal    DIAGNOSTICS:   EKG: appears normal, NSR, normal axis, normal intervals, no acute ST/T changes c/w ischemia, no LVH by voltage criteria, Left Bundle Branch Block, unchanged from previous tracings (11/27/2019)    Labs Resulted Today:   Results for orders placed or performed in visit on 09/10/20   CBC with platelets     Status: None   Result Value Ref Range    WBC 7.7 4.0 - 11.0 10e9/L    RBC Count 4.47 3.8 - 5.2 10e12/L    Hemoglobin 13.8 11.7 - 15.7 g/dL    Hematocrit 41.6 35.0 - 47.0 %    MCV 93 78 - 100 fl    MCH 30.9 26.5 - 33.0 pg    MCHC 33.2 31.5 - 36.5 g/dL    RDW 11.8 10.0 - 15.0 %    Platelet Count 302 150 - 450 10e9/L   Basic metabolic panel     Status: Abnormal   Result Value Ref Range    Sodium 140 133 - 144 mmol/L    Potassium 3.9 3.4 - 5.3 mmol/L    Chloride 109 94 - 109 mmol/L    Carbon Dioxide 28 20 - 32 mmol/L    Anion Gap 3 3 - 14 mmol/L    Glucose 103 (H) 70 - 99 mg/dL    Urea Nitrogen 23 7 - 30 mg/dL    Creatinine 0.98 0.52 - 1.04 mg/dL    GFR Estimate 59 (L) >60 mL/min/[1.73_m2]    GFR Estimate If Black 68 >60 mL/min/[1.73_m2]    Calcium 9.3 8.5 - 10.1 mg/dL   Magnesium     Status: Abnormal   Result Value Ref Range    Magnesium 2.4 (H) 1.6 - 2.3 mg/dL   Lipid Profile     Status: Abnormal   Result Value Ref Range    Cholesterol 223 (H) <200 mg/dL    Triglycerides 118 <150 mg/dL    HDL Cholesterol 53 >49 mg/dL    LDL Cholesterol Calculated 146 (H) <100 mg/dL    Non HDL Cholesterol 170 (H) <130 mg/dL   TSH with free T4 reflex     Status: None   Result Value Ref Range    TSH 1.66 0.40 - 4.00 mU/L         Recent Labs   Lab Test 11/22/19  1051   HGB 13.2         POTASSIUM 4.0   CR 0.97     NM stress test (11/27/2019):   Conclusion    The nuclear  stress test is abnormal.  There is a fixed apical and septal abnormality.  No reversable ischemia is noted     The left ventricular ejection fraction at stress is 69%.     A prior study was conducted on 9/24/2013.     Nondiagnositc Stress ECG due to LBBB    CTA coronary (11/22/2019):  IMPRESSION: Extremely dense calcification in the proximal left anterior descending artery. The degree of stenosis cannot be determined due to the dense calcifications. No other coronary artery stenoses or occlusions are seen.  IMPRESSION:   Reason for surgery/procedure: Right cataract surgery   Diagnosis/reason for consult: medical management     The proposed surgical procedure is considered LOW risk.    REVISED CARDIAC RISK INDEX  The patient has the following serious cardiovascular risks for perioperative complications such as (MI, PE, VFib and 3  AV Block):  Coronary Artery Disease (MI, positive stress test, angina, Qs on EKG)  INTERPRETATION: 2 risks: Class III (moderate risk - 6.6% complication rate)    The patient has the following additional risks for perioperative complications:        ICD-10-CM    1. Preop general physical exam  Z01.818    2. Hyperlipidemia LDL goal <100  E78.5    3. Abnormal stress test  R94.39        RECOMMENDATIONS:       Cardiovascular Risk  Gypsy has stable angina for many years. Cataract surgery is low risk, d/t unchanged and stable angina, consistent EKG and stress test, Gypsy is okay to proceed with her procedure.   - c/w ASA 81mg  - start pravastatin. Reluctant to start crestor d/t current leg cramps  - ASCVD risk of 7.2%    # Leg cramps: TSH wnl. Mg slightly high, K wnl, Hgb normal along with MCV. Most likely d/t dehydration.  - encourage hydration   - B12 level pending   - currently taking B12 complex      --Patient is to take all scheduled medications on the day of surgery    Anticoagulant or Antiplatelet Medication Use  ASPIRIN: Bleeding risk is low for this procedure and aspirin 81 mg daily  should be continued in the perioperative period        Approval is given to proceed with proposed procedure. Cardiology referral has been placed but no need to delay procedure.        Signed Electronically by: Kaylen Hawkins MD    Copy of this evaluation report is provided to requesting physician.    [place] Preop Guidelines    Revised Cardiac Risk Index

## 2020-08-28 NOTE — PATIENT INSTRUCTIONS
"We put in a referral for cologuard.  We put in a referral for cardiology.   Start pravastatin for cholesterol. Call the clinic if your leg cramps get worse.  Buy a new water bottle. Goal of 2 quarts per day.     Preparing for Your Surgery  Getting started  A surgery nurse will call you to review your health history and instructions. They will give you an arrival time based on your scheduled surgery time.  Please be ready to share the following:    Your doctor's clinic name and phone number    Your medical, surgical and anesthesia history    A list of allergies and sensitivities    A list of medicines, including herbal treatments and over-the-counter drugs    Whether the patient has a legal guardian (ask how to send us the papers in advance)  If your child is having surgery, please ask for a copy of Preparing for Your Child's Surgery.    Preparing for surgery    Within 30 days of surgery: Have an exam at your family clinic (primary care clinic), or go to a pre-operative clinic. This exam is called a \"History and Physical,\" or H&P.    At your H&P exam, talk to your care team about all medicines you take. If you need to stop any medicines before surgery, ask when to start taking them again.  ? We do this for your safety. Many medicines can make you bleed too much during surgery. Some change how well surgery (anesthesia) drugs work.    Call your insurance company to see what it will and won't pay for. Ask if they need to pre-approve the surgery. (If no insurance, call [phone].)    Call your surgeon's clinic if there's any change in your health. This includes signs of a cold or flu (sore throat, runny nose, cough, rash, fever). It also includes a scrape or scratch near the surgery site.    If you have questions on the day of surgery, call your surgery center.  Eating and drinking guidelines  For your safety: Unless your surgeon tells you otherwise, follow the guidelines below.    Eat and drink as usual until 8 hours " before surgery. After that, no food or milk.    Drink clear liquids until 2 hours before surgery. These are liquids you can see through, like water, Gatorade and Propel Water. You may also have black coffee and tea (no cream or milk).    Nothing by mouth within 2 hours of surgery. This includes gum, candy and breath mints.    Stop alcohol the midnight before surgery.    If your family clinic tells you to take medicine on the morning of surgery, it's okay to take it with a sip of water.  Preventing infection    Shower or bathe the night before and morning of your surgery. Follow the instructions your clinic gave you. (If no instructions, use regular soap.)    Don't shave or clip hair near your surgery site. This can lead to skin infection.    Don't smoke the morning of surgery. Smoking increases the risk of infection. You may chew nicotine gum up to 2 hours before surgery. A nicotine patch is okay.  ? Note: Some surgeries require you to completely quit smoking and nicotine. Check with your surgeon.    Your care team will make every effort to keep you safe from infection. We will:  ? Clean our hands often with soap and water (or an alcohol-based hand rub).  ? Clean the skin at your surgery site with a special soap that kills germs. We'll also remove hair from the site as needed.  ? Wear special hair covers, masks, gowns and gloves during surgery.  ? Give antibiotic medicine, if prescribed. Not all surgeries need antibiotics.  What to bring on the day of surgery    Photo ID and insurance card    Copy of your health care directive, if you have one    Glasses and hearing aides (bring cases)  ? You can't wear contacts during surgery    Inhaler and eye drops, if you use them (tell us about these when you arrive)    CPAP machine or breathing device, if you use them    A few personal items, if spending the night    If you have . . .  ? A pacemaker or ICD (cardiac defibrillator): Bring the ID card.  ? An implanted stimulator:  Bring the remote control.  ? A legal guardian: Bring a copy of the certified (court-stamped) guardianship papers.  Please remove any jewelry, including body piercings. Leave jewelry and other valuables at home.  If you're going home the day of surgery  Important: If you don't follow the rules below, we must cancel your surgery.     Arrange for someone to drive you home after surgery. You may not drive, take a taxi or take public transportation by yourself (unless you'll have local anesthesia only).    Arrange for a responsible adult to stay with you overnight. If you don't, we may keep you in the hospital overnight, and you may need to pay the costs yourself.  Questions?   If you have any questions for your care team, list them here: _________________________________________________________________________________________________________________________________________________________________________________________________________________________________________________________________________________________________________________________  For informational purposes only. Not to replace the advice of your health care provider. Copyright   5670-7866 [facility]. All rights reserved. Clinically reviewed by Lalita Desai MD. [employer] [id number] - REV 07/19.

## 2020-09-09 PROBLEM — R94.39 ABNORMAL STRESS TEST: Status: ACTIVE | Noted: 2020-09-09

## 2020-09-10 ENCOUNTER — OFFICE VISIT (OUTPATIENT)
Dept: FAMILY MEDICINE | Facility: OTHER | Age: 70
End: 2020-09-10
Attending: FAMILY MEDICINE
Payer: MEDICARE

## 2020-09-10 ENCOUNTER — APPOINTMENT (OUTPATIENT)
Dept: LAB | Facility: OTHER | Age: 70
End: 2020-09-10
Attending: FAMILY MEDICINE
Payer: MEDICARE

## 2020-09-10 VITALS
BODY MASS INDEX: 21.03 KG/M2 | HEIGHT: 67 IN | SYSTOLIC BLOOD PRESSURE: 120 MMHG | TEMPERATURE: 98.4 F | HEART RATE: 69 BPM | OXYGEN SATURATION: 94 % | DIASTOLIC BLOOD PRESSURE: 64 MMHG | WEIGHT: 134 LBS

## 2020-09-10 DIAGNOSIS — E78.5 HYPERLIPIDEMIA LDL GOAL <100: ICD-10-CM

## 2020-09-10 DIAGNOSIS — R94.39 ABNORMAL STRESS TEST: ICD-10-CM

## 2020-09-10 DIAGNOSIS — Z01.818 PREOP GENERAL PHYSICAL EXAM: Primary | ICD-10-CM

## 2020-09-10 DIAGNOSIS — R25.2 LEG CRAMP: ICD-10-CM

## 2020-09-10 LAB
ANION GAP SERPL CALCULATED.3IONS-SCNC: 3 MMOL/L (ref 3–14)
BUN SERPL-MCNC: 23 MG/DL (ref 7–30)
CALCIUM SERPL-MCNC: 9.3 MG/DL (ref 8.5–10.1)
CHLORIDE SERPL-SCNC: 109 MMOL/L (ref 94–109)
CHOLEST SERPL-MCNC: 223 MG/DL
CO2 SERPL-SCNC: 28 MMOL/L (ref 20–32)
CREAT SERPL-MCNC: 0.98 MG/DL (ref 0.52–1.04)
ERYTHROCYTE [DISTWIDTH] IN BLOOD BY AUTOMATED COUNT: 11.8 % (ref 10–15)
GFR SERPL CREATININE-BSD FRML MDRD: 59 ML/MIN/{1.73_M2}
GLUCOSE SERPL-MCNC: 103 MG/DL (ref 70–99)
HCT VFR BLD AUTO: 41.6 % (ref 35–47)
HDLC SERPL-MCNC: 53 MG/DL
HGB BLD-MCNC: 13.8 G/DL (ref 11.7–15.7)
LDLC SERPL CALC-MCNC: 146 MG/DL
MAGNESIUM SERPL-MCNC: 2.4 MG/DL (ref 1.6–2.3)
MCH RBC QN AUTO: 30.9 PG (ref 26.5–33)
MCHC RBC AUTO-ENTMCNC: 33.2 G/DL (ref 31.5–36.5)
MCV RBC AUTO: 93 FL (ref 78–100)
NONHDLC SERPL-MCNC: 170 MG/DL
PLATELET # BLD AUTO: 302 10E9/L (ref 150–450)
POTASSIUM SERPL-SCNC: 3.9 MMOL/L (ref 3.4–5.3)
RBC # BLD AUTO: 4.47 10E12/L (ref 3.8–5.2)
SODIUM SERPL-SCNC: 140 MMOL/L (ref 133–144)
TRIGL SERPL-MCNC: 118 MG/DL
TSH SERPL DL<=0.005 MIU/L-ACNC: 1.66 MU/L (ref 0.4–4)
WBC # BLD AUTO: 7.7 10E9/L (ref 4–11)

## 2020-09-10 PROCEDURE — 99214 OFFICE O/P EST MOD 30 MIN: CPT | Mod: 25 | Performed by: FAMILY MEDICINE

## 2020-09-10 PROCEDURE — 84443 ASSAY THYROID STIM HORMONE: CPT | Mod: ZL | Performed by: FAMILY MEDICINE

## 2020-09-10 PROCEDURE — 93010 ELECTROCARDIOGRAM REPORT: CPT | Performed by: INTERNAL MEDICINE

## 2020-09-10 PROCEDURE — G0463 HOSPITAL OUTPT CLINIC VISIT: HCPCS

## 2020-09-10 PROCEDURE — 82607 VITAMIN B-12: CPT | Mod: ZL | Performed by: FAMILY MEDICINE

## 2020-09-10 PROCEDURE — 80048 BASIC METABOLIC PNL TOTAL CA: CPT | Mod: ZL | Performed by: FAMILY MEDICINE

## 2020-09-10 PROCEDURE — 83735 ASSAY OF MAGNESIUM: CPT | Mod: ZL | Performed by: FAMILY MEDICINE

## 2020-09-10 PROCEDURE — 80061 LIPID PANEL: CPT | Mod: ZL | Performed by: FAMILY MEDICINE

## 2020-09-10 PROCEDURE — 85027 COMPLETE CBC AUTOMATED: CPT | Mod: ZL | Performed by: FAMILY MEDICINE

## 2020-09-10 PROCEDURE — 36415 COLL VENOUS BLD VENIPUNCTURE: CPT | Mod: ZL | Performed by: FAMILY MEDICINE

## 2020-09-10 RX ORDER — PRAVASTATIN SODIUM 20 MG
10 TABLET ORAL DAILY
Qty: 45 TABLET | Refills: 0 | Status: SHIPPED | OUTPATIENT
Start: 2020-09-10 | End: 2020-10-07 | Stop reason: SINTOL

## 2020-09-10 ASSESSMENT — MIFFLIN-ST. JEOR: SCORE: 1162.51

## 2020-09-10 ASSESSMENT — PAIN SCALES - GENERAL: PAINLEVEL: NO PAIN (0)

## 2020-09-10 NOTE — NURSING NOTE
"Chief Complaint   Patient presents with     Pre-Op Exam       Initial /64 (BP Location: Left arm, Patient Position: Sitting, Cuff Size: Adult Regular)   Pulse 69   Temp 98.4  F (36.9  C) (Tympanic)   Ht 1.689 m (5' 6.5\")   Wt 60.8 kg (134 lb)   SpO2 94%   BMI 21.30 kg/m   Estimated body mass index is 21.3 kg/m  as calculated from the following:    Height as of this encounter: 1.689 m (5' 6.5\").    Weight as of this encounter: 60.8 kg (134 lb).  Medication Reconciliation: complete  Sultana Clark LPN  "

## 2020-09-11 ENCOUNTER — TELEPHONE (OUTPATIENT)
Dept: FAMILY MEDICINE | Facility: OTHER | Age: 70
End: 2020-09-11

## 2020-09-11 LAB — VIT B12 SERPL-MCNC: 479 PG/ML (ref 193–986)

## 2020-09-29 ENCOUNTER — TELEPHONE (OUTPATIENT)
Dept: FAMILY MEDICINE | Facility: OTHER | Age: 70
End: 2020-09-29

## 2020-09-29 NOTE — TELEPHONE ENCOUNTER
Patient states you put her on pravastatin and iit gave her leg cramps and has stopped about 1 week ago. She said she was to call with any issues.If you need to see her or talk to her  please contact

## 2020-10-05 ENCOUNTER — TRANSFERRED RECORDS (OUTPATIENT)
Dept: HEALTH INFORMATION MANAGEMENT | Facility: CLINIC | Age: 70
End: 2020-10-05

## 2020-10-05 LAB — COLOGUARD-ABSTRACT: NEGATIVE

## 2020-10-06 DIAGNOSIS — Z82.49 FAMILY HISTORY OF HEART DISEASE: ICD-10-CM

## 2020-10-06 DIAGNOSIS — R94.39 ABNORMAL STRESS TEST: ICD-10-CM

## 2020-10-06 DIAGNOSIS — E78.5 HYPERLIPIDEMIA LDL GOAL <100: Primary | ICD-10-CM

## 2020-10-07 ENCOUNTER — OFFICE VISIT (OUTPATIENT)
Dept: CARDIOLOGY | Facility: OTHER | Age: 70
End: 2020-10-07
Attending: FAMILY MEDICINE
Payer: MEDICARE

## 2020-10-07 VITALS
BODY MASS INDEX: 21.37 KG/M2 | SYSTOLIC BLOOD PRESSURE: 135 MMHG | DIASTOLIC BLOOD PRESSURE: 75 MMHG | TEMPERATURE: 97.5 F | OXYGEN SATURATION: 97 % | WEIGHT: 134.4 LBS

## 2020-10-07 DIAGNOSIS — I25.10 CORONARY ARTERY DISEASE INVOLVING NATIVE CORONARY ARTERY OF NATIVE HEART WITHOUT ANGINA PECTORIS: ICD-10-CM

## 2020-10-07 DIAGNOSIS — Z82.49 FAMILY HISTORY OF HEART DISEASE: ICD-10-CM

## 2020-10-07 DIAGNOSIS — R94.39 ABNORMAL STRESS TEST: ICD-10-CM

## 2020-10-07 DIAGNOSIS — E78.2 MIXED HYPERLIPIDEMIA: ICD-10-CM

## 2020-10-07 DIAGNOSIS — R07.9 CHEST PAIN, UNSPECIFIED TYPE: Primary | ICD-10-CM

## 2020-10-07 DIAGNOSIS — R91.1 RIGHT UPPER LOBE PULMONARY NODULE: ICD-10-CM

## 2020-10-07 DIAGNOSIS — R93.1 ELEVATED CORONARY ARTERY CALCIUM SCORE: ICD-10-CM

## 2020-10-07 DIAGNOSIS — I44.7 LEFT BUNDLE BRANCH BLOCK (LBBB): ICD-10-CM

## 2020-10-07 DIAGNOSIS — E78.5 HYPERLIPIDEMIA LDL GOAL <100: ICD-10-CM

## 2020-10-07 DIAGNOSIS — Z23 NEED FOR PROPHYLACTIC VACCINATION AND INOCULATION AGAINST INFLUENZA: ICD-10-CM

## 2020-10-07 PROCEDURE — 90662 IIV NO PRSV INCREASED AG IM: CPT

## 2020-10-07 PROCEDURE — G0463 HOSPITAL OUTPT CLINIC VISIT: HCPCS | Mod: 25

## 2020-10-07 PROCEDURE — G0463 HOSPITAL OUTPT CLINIC VISIT: HCPCS

## 2020-10-07 PROCEDURE — 2894A VOIDCORRECT: CPT | Performed by: INTERNAL MEDICINE

## 2020-10-07 PROCEDURE — 99204 OFFICE O/P NEW MOD 45 MIN: CPT | Performed by: INTERNAL MEDICINE

## 2020-10-07 RX ORDER — ASPIRIN 81 MG/1
81 TABLET, CHEWABLE ORAL DAILY
Qty: 90 TABLET | Refills: 3 | Status: SHIPPED | OUTPATIENT
Start: 2020-10-07 | End: 2021-10-21

## 2020-10-07 RX ORDER — NITROGLYCERIN 0.4 MG/1
TABLET SUBLINGUAL
Qty: 25 TABLET | Refills: 3 | Status: SHIPPED | OUTPATIENT
Start: 2020-10-07 | End: 2021-09-20

## 2020-10-07 RX ORDER — PRAVASTATIN SODIUM 20 MG
TABLET ORAL
Qty: 30 TABLET | Refills: 3 | Status: SHIPPED | OUTPATIENT
Start: 2020-10-07 | End: 2020-12-11

## 2020-10-07 RX ORDER — MULTIPLE VITAMINS W/ MINERALS TAB 9MG-400MCG
1 TAB ORAL DAILY
COMMUNITY
End: 2024-08-19

## 2020-10-07 ASSESSMENT — PAIN SCALES - GENERAL: PAINLEVEL: NO PAIN (0)

## 2020-10-07 NOTE — PATIENT INSTRUCTIONS
Thank you for allowing Dr. Waldron and our  team to participate in your care. Please call our office at [phone] with scheduling questions or if you need to cancel or change your appointment. With any other questions or concerns you may call [facility]elyn cardiology nurse at [phone].       If you experience chest pain, chest pressure, chest tightness, shortness of breath, fainting, lightheadedness, nausea, vomiting, or other concerning symptoms, please report to the Emergency Department or call 911. These symptoms may be emergent, and best treated in the Emergency Department.    Follow up with us as needed     Start Pravastatin 1/4 Tab 3 x weekly     Nitroglycerin Tabs Prescribed     Continue on Aspirin.    Call if you would like to consider Angiogram.       Patient Education     4 Steps for Eating Healthier  Changing the way you eat can improve your health. It can lower your cholesterol and blood pressure, and help you stay at a healthy weight. Your diet doesn t have to be bland and boring to be healthy. Just watch your calories and follow these steps:    Step 1. Eat fewer unhealthy fats    Choose more fish and lean meats instead of fatty cuts of meat.    Skip butter and lard, and use less margarine.    Pass on foods that have palm, coconut, or hydrogenated oils.    Eat fewer high-fat dairy foods like cheese, ice cream, and whole milk.    Get a heart-healthy cookbook and try some low-fat recipes.    Step 2. Go light on salt    Keep the saltshaker off the table.    Limit high-salt ingredients, such as soy sauce, bouillon, and garlic salt.    Instead of adding salt when cooking, season your food with herbs and flavorings. Try lemon, garlic, and onion, or salt-free herb seasonings.    Limit convenience foods, such as boxed or canned foods and restaurant food.    Read food labels and choose lower-sodium options.    Step 3. Limit sugar    Pause before you add sugars to pancakes, cereal, coffee, or tea. This includes  white and brown table sugar, syrup, honey, and molasses. Cut your usual amount by half.    Use non-sugar sweeteners. Stevia, aspartame, and sucralose can satisfy a sweet tooth without adding calories.    Swap out sugar-filled soda and other drinks. Buy sugar-free or low-calorie beverages. Remember water is always the best choice.    Read labels and choose foods with less added sugar. Keep in mind that dairy foods and foods with fruit will have some natural sugar.    Cut the sugar in recipes by 1/3 to 1/2. Boost the flavor with extracts like almond, vanilla, or orange. Or add spices such as cinnamon or nutmeg.    Step 4. Eat more fiber    Eat fresh fruits and vegetables every day.    Boost your diet with whole grains. Go for oats, whole-grain rice, and bran.    Add beans and lentils to your meals.    Drink more water to match your fiber increase to help prevent constipation.    Date Last Reviewed: 6/1/2017 2000-2019 [employer], "[employer]". [address] [place] [address], [place] [address]. All rights reserved. This information is not intended as a substitute for professional medical care. Always follow your healthcare professional's instructions.

## 2020-10-07 NOTE — PROGRESS NOTES
[facility] HEART CARE   CARDIOLOGY CONSULT     Gypsy Blackburn   1950  [id number]    Kaylen Hawkins     Chief Complaint   Patient presents with     Consult     Imm/Inj     Flu Shot          HPI:   Mrs. Blackburn is a 70-year-old female who is being seen by cardiology for chest pain, abnormal stress testing, and elevated calcium score on a CTA of the coronaries.  There is also history of hyperlipidemia, family history of heart disease, left bundle branch block, right upper lobe pulmonary nodule, and elevated calcium score.    Today, she endorses chest pain.  She describes a twinge-like pain to her chest lasting seconds.  Periodically, she will have a tightness in her neck.  She states the tightness to her neck will happen 1-2 times a month.  These symptoms will happen in the middle the night.  This symptom is not necessarily activity driven.  She reports the previous day raking and lifting bricks.  She did not have concerning anginal symptoms but was considerably dyspneic.  She states she is significantly short of breath and winded with these activities.  She denies arm, jaw, shoulder, significant heartburn, or intrascapular pain.  Risk factors include hyperlipidemia which she has been hesitant to start statins, family history of heart disease but denies diabetes, hypertension, history of tobacco abuse, obesity, sedentary lifestyle, and poor diet.  She has been on statins before and has had some leg cramping.  She states she would not be willing to take most prescribed medications.    She had a stress test on 11/27/2019 showing a fixed apical and septal defect with an EF of 69%.    CTA of coronaries on 11/22/2019 showed extra thick calcium involving the LAD with a total calcium score of 267.  Left main with atherosclerotic plaquing, circumflex and marginal branches patent, and RCA and marginal branches normal.    We discussed her findings of heart disease and recommendations of a cardiac catheterization as she  "is having symptoms but not definitively cardiac related.  She was noted to have left bundle branch block previously.  She has been resistant to medications but takes aspirin 81 mg \"when she remembers \".  She is not taking pravastatin consistently secondary to leg cramping.  Her ACC/AHA 10-year risk was 8.9% which was showed to the patient and explained.  This warrants moderate to high intensity statin.  After this discussion, she stated she would take 1/4 tablet of pravastatin 3 times a week.  I recommended cardiac catheterization but patient not willing to travel or willing to consider to have the procedure done.  She was told to think about it and let us know if she would like to move forward.    IMAGING RESULTS:   Stress test on 11/27/19:     The nuclear stress test is abnormal.  There is a fixed apical and septal abnormality.  No reversible ischemia is noted     The left ventricular ejection fraction at stress is 69%.     A prior study was conducted on 9/24/2013.     Nondiagnositc Stress ECG due to LBBB    CTA angiogram on 11/22/2019:  Extremely dense calcification in the proximal left  anterior descending artery. The degree of stenosis cannot be  determined due to the dense calcifications. No other coronary artery  stenoses or occlusions are seen.    CURRENT MEDICATIONS:   Prior to Admission medications    Medication Sig Start Date End Date Taking? Authorizing Provider   aspirin (ASA) 81 MG EC tablet Take 81 mg by mouth daily  11/23/12   Reported, Patient   B Complex-Biotin-FA (SUPER B-COMPLEX) TABS Take by mouth daily     Reported, Patient   CALCIUM PO Take 600 mg by mouth daily  9/23/14   Reported, Patient   Cholecalciferol (VITAMIN D3) 50 MCG (2000 UT) CAPS daily  9/23/14   Reported, Patient   magnesium 250 MG tablet Take 1 tablet by mouth daily    Reported, Patient   pravastatin (PRAVACHOL) 20 MG tablet Take 0.5 tablets (10 mg) by mouth daily 9/10/20   Kaylen Hawkins MD   red yeast rice 600 MG CAPS " Take 600 mg by mouth daily  12/5/13   Reported, Patient       ALLERGIES:   Allergies   Allergen Reactions     Bee Venom      unknown reaction     Iodine      unknown reaction     Penicillins      Childhood     Etodolac Rash        PAST MEDICAL HISTORY:   History reviewed. No pertinent past medical history.     PAST SURGICAL HISTORY:   Past Surgical History:   Procedure Laterality Date     APPENDECTOMY  1964     ARTHROSCOPY KNEE Left 1989     HYSTERECTOMY  1974     HYSTERECTOMY TOTAL ABDOMINAL  2001     TONSILLECTOMY  1954        FAMILY HISTORY:   Family History   Problem Relation Age of Onset     Myocardial Infarction Mother      Alcoholism Father         Shot deer hunting     Mental Illness Brother      Myocardial Infarction Brother      Myocardial Infarction Brother      Myocardial Infarction Brother      Myocardial Infarction Brother         SOCIAL HISTORY:   Social History     Socioeconomic History     Marital status:      Spouse name: Not on file     Number of children: 3     Years of education: Not on file     Highest education level: Not on file   Occupational History     Not on file   Social Needs     Financial resource strain: Not on file     Food insecurity     Worry: Not on file     Inability: Not on file     Transportation needs     Medical: Not on file     Non-medical: Not on file   Tobacco Use     Smoking status: Never Smoker     Smokeless tobacco: Never Used   Substance and Sexual Activity     Alcohol use: Not Currently     Comment: former     Drug use: Never     Sexual activity: Not on file   Lifestyle     Physical activity     Days per week: Not on file     Minutes per session: Not on file     Stress: Not on file   Relationships     Social connections     Talks on phone: Not on file     Gets together: Not on file     Attends Zoroastrianism service: Not on file     Active member of club or organization: Not on file     Attends meetings of clubs or organizations: Not on file     Relationship  status: Not on file     Intimate partner violence     Fear of current or ex partner: Not on file     Emotionally abused: Not on file     Physically abused: Not on file     Forced sexual activity: Not on file   Other Topics Concern     Not on file   Social History Narrative     Not on file          ROS:   CONSTITUTIONAL: No weight loss, fever, chills, weakness or fatigue.   HEENT: Eyes: No visual changes. Ears, Nose, Throat: No hearing loss, congestion or difficulty swallowing.   CARDIOVASCULAR: (+) chest pain, chest pressure and chest discomfort. No palpitations or lower extremity edema.   RESPIRATORY: (+) shortness of breath with dyspnea upon exertion, no cough or sputum production.   GASTROINTESTINAL: No abdominal pain. No anorexia, nausea, vomiting or diarrhea.   NEUROLOGICAL: No headache, lightheadedness, dizziness, syncope, ataxia or weakness.   HEMATOLOGIC: No anemia, bleeding or bruising.   PSYCHIATRIC: No history of depression or anxiety.   ENDOCRINOLOGIC: No reports of sweating, cold or heat intolerance. No polyuria or polydipsia.   SKIN: No abnormal rashes or itching.       PHYSICAL EXAM:   GENERAL: The patient is a well-developed, well-nourished, in no apparent distress. Alert and oriented x3.   HEENT: Head is normocephalic and atraumatic. Eyes are symmetrical with normal visual tracking.  HEART: Regular rate and rhythm, S1S2 present without murmur, rub or gallop.   LUNGS: Respirations regular and unlabored. Clear to auscultation.   GI: Abdomen is soft and nondistended.   EXTREMITIES: No peripheral edema present.   MUSCULOSKELETAL: No joint swelling.   NEUROLOGIC: Alert and oriented X3.    SKIN: No jaundice. No rashes or visible skin lesions present.        LAB RESULTS:   Office Visit on 09/10/2020   Component Date Value Ref Range Status     WBC 09/10/2020 7.7  4.0 - 11.0 10e9/L Final     RBC Count 09/10/2020 4.47  3.8 - 5.2 10e12/L Final     Hemoglobin 09/10/2020 13.8  11.7 - 15.7 g/dL Final      Hematocrit 09/10/2020 41.6  35.0 - 47.0 % Final     MCV 09/10/2020 93  78 - 100 fl Final     MCH 09/10/2020 30.9  26.5 - 33.0 pg Final     MCHC 09/10/2020 33.2  31.5 - 36.5 g/dL Final     RDW 09/10/2020 11.8  10.0 - 15.0 % Final     Platelet Count 09/10/2020 302  150 - 450 10e9/L Final     Sodium 09/10/2020 140  133 - 144 mmol/L Final     Potassium 09/10/2020 3.9  3.4 - 5.3 mmol/L Final     Chloride 09/10/2020 109  94 - 109 mmol/L Final     Carbon Dioxide 09/10/2020 28  20 - 32 mmol/L Final     Anion Gap 09/10/2020 3  3 - 14 mmol/L Final     Glucose 09/10/2020 103* 70 - 99 mg/dL Final     Urea Nitrogen 09/10/2020 23  7 - 30 mg/dL Final     Creatinine 09/10/2020 0.98  0.52 - 1.04 mg/dL Final     GFR Estimate 09/10/2020 59* >60 mL/min/[1.73_m2] Final     GFR Estimate If Black 09/10/2020 68  >60 mL/min/[1.73_m2] Final     Calcium 09/10/2020 9.3  8.5 - 10.1 mg/dL Final     Magnesium 09/10/2020 2.4* 1.6 - 2.3 mg/dL Final     Vitamin B12 09/10/2020 479  193 - 986 pg/mL Final     Cholesterol 09/10/2020 223* <200 mg/dL Final     Triglycerides 09/10/2020 118  <150 mg/dL Final     HDL Cholesterol 09/10/2020 53  >49 mg/dL Final     LDL Cholesterol Calculated 09/10/2020 146* <100 mg/dL Final     Non HDL Cholesterol 09/10/2020 170* <130 mg/dL Final     TSH 09/10/2020 1.66  0.40 - 4.00 mU/L Final          ASSESSMENT:       ICD-10-CM    1. Chest pain, unspecified type  R07.9 nitroGLYcerin (NITROSTAT) 0.4 MG sublingual tablet     aspirin (ASA) 81 MG chewable tablet   2. Coronary artery disease involving native coronary artery of native heart without angina pectoris  I25.10    3. Abnormal stress test  R94.39 EKG 12-lead complete w/read - (Clinic Performed)     nitroGLYcerin (NITROSTAT) 0.4 MG sublingual tablet     aspirin (ASA) 81 MG chewable tablet   4. Hyperlipidemia LDL goal <100  E78.5 EKG 12-lead complete w/read - (Clinic Performed)     pravastatin (PRAVACHOL) 20 MG tablet   5. Mixed hyperlipidemia  E78.2    6. Family history  of heart disease  Z82.49 EKG 12-lead complete w/read - (Clinic Performed)     aspirin (ASA) 81 MG chewable tablet   7. Left bundle branch block (LBBB)  I44.7    8. Right upper lobe pulmonary nodule on imaging from 11/22/2019  R91.1    9. Elevated coronary artery calcium score  R93.1 aspirin (ASA) 81 MG chewable tablet   10. Need for prophylactic vaccination and inoculation against influenza  Z23 FLUZONE HIGH DOSE 65+  [31960]     Vaccine Administration, Initial [67429]         PLAN:   1.  Continue aspirin 81 mg chewable for the rest of her life.  2.  Try taking quarter tablet pravastatin 10 mg, 3 times a week.  Patient was unwilling to take anything else.  3.  She was given a prescription for sublingual nitro as needed for chest pain.  She states she will fill the medication if cost is appropriate.  4.  Based on a CTA scan with dense calcium in the proximal LAD as well as a stress test with a fixed apical/septal defect, and chest pain, have suggested an angiogram.  After long discussion patient declined.  Was told to call if she were to change her mind.  5.  May consider repeat CT scan of chest as she was noted to have a nodule on CT scan for calcium.  6.  Follow-up in the future on as-needed basis.    Thank you for allowing me to participate in the care of your patient. Please do not hesitate to contact me if you have any questions.     Abdulkadir Waldron, DO

## 2020-10-07 NOTE — NURSING NOTE
"Chief Complaint   Patient presents with     Consult       Initial /75 (BP Location: Right arm, Patient Position: Sitting, Cuff Size: Adult Large)   Temp 97.5  F (36.4  C) (Tympanic)   Wt 61 kg (134 lb 6.4 oz)   SpO2 97%   BMI 21.37 kg/m   Estimated body mass index is 21.37 kg/m  as calculated from the following:    Height as of 9/10/20: 1.689 m (5' 6.5\").    Weight as of this encounter: 61 kg (134 lb 6.4 oz).  Medication Reconciliation: complete  Evelyn Jo LPN    "

## 2020-11-14 ENCOUNTER — HEALTH MAINTENANCE LETTER (OUTPATIENT)
Age: 70
End: 2020-11-14

## 2020-12-10 NOTE — PROGRESS NOTES
Subjective     Gypsy Blackburn is a 70 year old female who presents to clinic today for the following health issues:    HPI         Hyperlipidemia / CAD/ abnormal stress test Follow-Up      Are you regularly taking any medication or supplement to lower your cholesterol?   Yes- Pravastatin    Are you having muscle aches or other side effects that you think could be caused by your cholesterol lowering medication?  No    How often do you take nitroglycerin? Never    Do you take an aspirin every day? Yes    - ASA 81mg  - pravastatin 1/4 tablet every day, and working up her way up to full tablet once per day. No issues with legs pains  - visited with Dr. Waldron, cardiology, on 10/7/2020 with recommendation for cardiac cath. Follow up prn  - Gypsy would like to wait until spring then reconsider heart cath    # Lung nodule: 2cm right hilar lymph node. 6mm RLL nodule on CTA coronary (11/22/2019)  - due for repeat CT scan    # Right hand pain: chronic.   - Gypsy has stopped crocheting, growing peas, etc d/t hand pain. Her 2nd MCJ is painful  - declines xrays and oral medications at this time    # Wellness:  - Mammogram (9/17/2013, [facility]): declines   - STDs:  - Immunizations:  - Lipids (9/10/2020): . Agreed to limited use of pravastatin.   - Dexa scan (9/27/2017): Left hip T -1.2, femoral neck T -1.3, lumbar spine T -1.4. major risk 8.7%, hip fx 0.8%  - Colon cancer screening (10/5/2020): cologuard negative  - Lung cancer screening: never smoker  - AAA screening:         Review of Systems   Constitutional: Negative for chills and fever.   HENT: Negative for congestion.    Respiratory: Negative for shortness of breath.    Cardiovascular: Negative for chest pain and palpitations.   Gastrointestinal: Negative for abdominal pain.   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          Objective    /62 (BP Location: Left arm, Patient Position: Chair, Cuff  Size: Adult Regular)   Pulse 65   Temp 97.8  F (36.6  C) (Tympanic)   Resp 18   Wt 62.3 kg (137 lb 6.4 oz)   SpO2 98%   BMI 21.84 kg/m    Body mass index is 21.84 kg/m .  Physical Exam  Constitutional:       General: She is not in acute distress.     Appearance: She is not ill-appearing.   Cardiovascular:      Rate and Rhythm: Normal rate and regular rhythm.      Heart sounds: No murmur.   Pulmonary:      Effort: Pulmonary effort is normal. No respiratory distress.      Breath sounds: No wheezing or rales.   Musculoskeletal:      Right lower leg: No edema.      Left lower leg: No edema.      Comments: Index finger MCJ are slightly enlarged.    Neurological:      Mental Status: She is alert.            No labs        Assessment & Plan     Right lower lobe pulmonary nodule  - CT Chest w/o Contrast; Future    Hyperlipidemia LDL goal <100  Working up to full pill. Going well, no leg pains.   - pravastatin (PRAVACHOL) 20 MG tablet; Tk 1/4 pill every day    Visit for screening mammogram  - MA SCREENING DIGITAL BILATERAL ([place]); Future    Right hand pain  - diclofenac (VOLTAREN) 1 % topical gel; Apply 2 g topically 4 times daily as needed for moderate pain        See Patient Instructions    Return in about 6 months (around 6/11/2021).     Next visit:  - repeat lipid pane  - discuss heart cath ([facility])    Kaylen Hawkins MD  [facility] - [place]

## 2020-12-11 ENCOUNTER — OFFICE VISIT (OUTPATIENT)
Dept: FAMILY MEDICINE | Facility: OTHER | Age: 70
End: 2020-12-11
Attending: FAMILY MEDICINE
Payer: MEDICARE

## 2020-12-11 VITALS
WEIGHT: 137.4 LBS | HEART RATE: 65 BPM | RESPIRATION RATE: 18 BRPM | SYSTOLIC BLOOD PRESSURE: 120 MMHG | TEMPERATURE: 97.8 F | OXYGEN SATURATION: 98 % | DIASTOLIC BLOOD PRESSURE: 62 MMHG | BODY MASS INDEX: 21.84 KG/M2

## 2020-12-11 DIAGNOSIS — R91.1 RIGHT LOWER LOBE PULMONARY NODULE: Primary | ICD-10-CM

## 2020-12-11 DIAGNOSIS — E78.5 HYPERLIPIDEMIA LDL GOAL <100: ICD-10-CM

## 2020-12-11 DIAGNOSIS — M79.641 RIGHT HAND PAIN: ICD-10-CM

## 2020-12-11 DIAGNOSIS — Z12.31 VISIT FOR SCREENING MAMMOGRAM: ICD-10-CM

## 2020-12-11 PROCEDURE — 99214 OFFICE O/P EST MOD 30 MIN: CPT | Performed by: FAMILY MEDICINE

## 2020-12-11 PROCEDURE — G0463 HOSPITAL OUTPT CLINIC VISIT: HCPCS

## 2020-12-11 RX ORDER — PRAVASTATIN SODIUM 20 MG
TABLET ORAL
Qty: 30 TABLET | Refills: 3
Start: 2020-12-11 | End: 2021-04-12

## 2020-12-11 ASSESSMENT — ENCOUNTER SYMPTOMS
FEVER: 0
PALPITATIONS: 0
LIGHT-HEADEDNESS: 1
ABDOMINAL PAIN: 0
CHILLS: 0
SHORTNESS OF BREATH: 0

## 2020-12-11 ASSESSMENT — PAIN SCALES - GENERAL: PAINLEVEL: NO PAIN (0)

## 2020-12-11 NOTE — NURSING NOTE
"Chief Complaint   Patient presents with     Lipids     Coronary Artery Disease       Initial /62 (BP Location: Left arm, Patient Position: Chair, Cuff Size: Adult Regular)   Pulse 65   Temp 97.8  F (36.6  C) (Tympanic)   Resp 18   Wt 62.3 kg (137 lb 6.4 oz)   SpO2 98%   BMI 21.84 kg/m   Estimated body mass index is 21.84 kg/m  as calculated from the following:    Height as of 9/10/20: 1.689 m (5' 6.5\").    Weight as of this encounter: 62.3 kg (137 lb 6.4 oz).  Medication Reconciliation: complete  Merle Matamoros LPN  "

## 2020-12-11 NOTE — PATIENT INSTRUCTIONS
Try Voltaren gel for your hands  We put in an order for mammogram (spring)  We put in an order for CT chest.

## 2020-12-15 ENCOUNTER — HOSPITAL ENCOUNTER (OUTPATIENT)
Dept: CT IMAGING | Facility: HOSPITAL | Age: 70
Discharge: HOME | End: 2020-12-15
Attending: FAMILY MEDICINE | Admitting: FAMILY MEDICINE
Payer: MEDICARE

## 2020-12-15 DIAGNOSIS — R91.1 RIGHT LOWER LOBE PULMONARY NODULE: ICD-10-CM

## 2020-12-15 PROCEDURE — 71250 CT THORAX DX C-: CPT

## 2021-03-10 NOTE — PROGRESS NOTES
Assessment & Plan     Hyperlipidemia LDL goal <100  Lipid panel is improving. LDL (6/17/2021): 122  - Lipid Profile  - Comprehensive metabolic panel (BMP + Alb, Alk Phos, ALT, AST, Total. Bili, TP)  - CARDIOLOGY EVAL ADULT REFERRAL  - c/w pravastatin 10mg and increase as able    Abnormal stress test on 11/27/2019 / Left bundle branch block (LBBB) / Elevated coronary artery calcium score on 11/22/2019 / Coronary artery disease involving native coronary artery of native heart without angina pectoris  Gypsy agreed to have an angiogram done at [facility]  - CARDIOLOGY EVAL ADULT REFERRAL, for angiogram  - follow up with Dr. Waldron two weeks after the angiogram    Ingrown right big toenail  No concerning findings  Follows with podiatry    See Patient Instructions    Return in about 6 months (around 12/17/2021) for Follow up.    Kaylen Hawkins MD  [facility] - MAR[facility] FERNANDAMATTHEW Betancur is a 71 year old who presents for the following health issues     HPI     Hyperlipidemia Follow-Up      Are you regularly taking any medication or supplement to lower your cholesterol?   Yes- pravastatin 20 mg    Are you having muscle aches or other side effects that you think could be caused by your cholesterol lowering medication?  Yes- monique horses    - pravastatin 20mg, taking 1/2 tablet  - using mustard for the leg cramps  - no symptoms   - taking care of her boyfriend who has colon ca  - Follows with Dr. Waldron who has recommended heart cath which Gypsy has agree to have completed at [facility]    # Right great toe: ingrown nail and discoloration     Review of Systems   Constitutional: Negative for chills and fever.   HENT: Negative for congestion.    Respiratory: Negative for shortness of breath.    Cardiovascular: Negative for chest pain and palpitations.   Gastrointestinal: Negative for abdominal pain.   Skin:        Right great toenail discoloration            Objective    /52 (BP Location:  Left arm, Patient Position: Chair, Cuff Size: Adult Regular)   Pulse 67   Temp 98.1  F (36.7  C) (Tympanic)   Wt 65 kg (143 lb 6.4 oz)   SpO2 95%   BMI 22.80 kg/m    Body mass index is 22.8 kg/m .  Physical Exam  Constitutional:       General: She is not in acute distress.     Appearance: She is not ill-appearing.   Cardiovascular:      Rate and Rhythm: Normal rate and regular rhythm.      Heart sounds: No murmur.   Pulmonary:      Effort: Pulmonary effort is normal. No respiratory distress.      Breath sounds: No wheezing or rales.   Skin:     Comments: Right great toenail: white at lateral color. Some vertical dark red streaks consistent with splinter hemorrhage d/t management of ingrown toenail    Neurological:      Mental Status: She is alert.          Results for orders placed or performed in visit on 06/17/21 (from the past 24 hour(s))   Lipid Profile   Result Value Ref Range    Cholesterol 198 <200 mg/dL    Triglycerides 67 <150 mg/dL    HDL Cholesterol 63 >49 mg/dL    LDL Cholesterol Calculated 122 (H) <100 mg/dL    Non HDL Cholesterol 135 (H) <130 mg/dL   Comprehensive metabolic panel (BMP + Alb, Alk Phos, ALT, AST, Total. Bili, TP)   Result Value Ref Range    Sodium 143 133 - 144 mmol/L    Potassium 3.7 3.4 - 5.3 mmol/L    Chloride 110 (H) 94 - 109 mmol/L    Carbon Dioxide 30 20 - 32 mmol/L    Anion Gap 3 3 - 14 mmol/L    Glucose 96 70 - 99 mg/dL    Urea Nitrogen 19 7 - 30 mg/dL    Creatinine 1.00 0.52 - 1.04 mg/dL    GFR Estimate 57 (L) >60 mL/min/[1.73_m2]    GFR Estimate If Black 66 >60 mL/min/[1.73_m2]    Calcium 9.1 8.5 - 10.1 mg/dL    Bilirubin Total 0.8 0.2 - 1.3 mg/dL    Albumin 4.0 3.4 - 5.0 g/dL    Protein Total 7.1 6.8 - 8.8 g/dL    Alkaline Phosphatase 99 40 - 150 U/L    ALT 22 0 - 50 U/L    AST 19 0 - 45 U/L     The 10-year ASCVD risk score (Delilahdixon RAYMUNDO Jr., et al., 2013) is: 7.5%    Values used to calculate the score:      Age: 71 years      Sex: Female      Is Non- :  No      Diabetic: No      Tobacco smoker: No      Systolic Blood Pressure: 122 mmHg      Is BP treated: No      HDL Cholesterol: 63 mg/dL      Total Cholesterol: 198 mg/dL

## 2021-04-12 DIAGNOSIS — E78.5 HYPERLIPIDEMIA LDL GOAL <100: ICD-10-CM

## 2021-04-12 RX ORDER — PRAVASTATIN SODIUM 20 MG
20 TABLET ORAL DAILY
Qty: 30 TABLET | Refills: 3 | Status: SHIPPED | OUTPATIENT
Start: 2021-04-12 | End: 2021-12-17 | Stop reason: ALTCHOICE

## 2021-04-12 NOTE — TELEPHONE ENCOUNTER
pravastatin      Last Written Prescription Date:  12/11/2020  Last Fill Quantity: 30,   # refills: 3  Last Office Visit: 12/11/2020  Future Office visit:    Next 5 appointments (look out 90 days)    Jun 17, 2021 11:00 AM  (Arrive by 10:45 AM)  SHORT with Kaylen Hawkins MD  [facility] [place] ([facility] [place] ) [address]  [place] [address]  [phone]

## 2021-06-17 ENCOUNTER — TELEPHONE (OUTPATIENT)
Dept: FAMILY MEDICINE | Facility: OTHER | Age: 71
End: 2021-06-17

## 2021-06-17 ENCOUNTER — OFFICE VISIT (OUTPATIENT)
Dept: FAMILY MEDICINE | Facility: OTHER | Age: 71
End: 2021-06-17
Attending: FAMILY MEDICINE
Payer: MEDICARE

## 2021-06-17 VITALS
OXYGEN SATURATION: 95 % | WEIGHT: 143.4 LBS | BODY MASS INDEX: 22.8 KG/M2 | SYSTOLIC BLOOD PRESSURE: 122 MMHG | DIASTOLIC BLOOD PRESSURE: 52 MMHG | TEMPERATURE: 98.1 F | HEART RATE: 67 BPM

## 2021-06-17 DIAGNOSIS — I25.10 CORONARY ARTERY DISEASE INVOLVING NATIVE CORONARY ARTERY OF NATIVE HEART WITHOUT ANGINA PECTORIS: ICD-10-CM

## 2021-06-17 DIAGNOSIS — R94.39 ABNORMAL STRESS TEST: ICD-10-CM

## 2021-06-17 DIAGNOSIS — R93.1 ELEVATED CORONARY ARTERY CALCIUM SCORE: ICD-10-CM

## 2021-06-17 DIAGNOSIS — E78.5 HYPERLIPIDEMIA LDL GOAL <100: Primary | ICD-10-CM

## 2021-06-17 DIAGNOSIS — L60.0 INGROWN RIGHT BIG TOENAIL: ICD-10-CM

## 2021-06-17 DIAGNOSIS — I44.7 LEFT BUNDLE BRANCH BLOCK (LBBB): ICD-10-CM

## 2021-06-17 LAB
ALBUMIN SERPL-MCNC: 4 G/DL (ref 3.4–5)
ALP SERPL-CCNC: 99 U/L (ref 40–150)
ALT SERPL W P-5'-P-CCNC: 22 U/L (ref 0–50)
ANION GAP SERPL CALCULATED.3IONS-SCNC: 3 MMOL/L (ref 3–14)
AST SERPL W P-5'-P-CCNC: 19 U/L (ref 0–45)
BILIRUB SERPL-MCNC: 0.8 MG/DL (ref 0.2–1.3)
BUN SERPL-MCNC: 19 MG/DL (ref 7–30)
CALCIUM SERPL-MCNC: 9.1 MG/DL (ref 8.5–10.1)
CHLORIDE SERPL-SCNC: 110 MMOL/L (ref 94–109)
CHOLEST SERPL-MCNC: 198 MG/DL
CO2 SERPL-SCNC: 30 MMOL/L (ref 20–32)
CREAT SERPL-MCNC: 1 MG/DL (ref 0.52–1.04)
GFR SERPL CREATININE-BSD FRML MDRD: 57 ML/MIN/{1.73_M2}
GLUCOSE SERPL-MCNC: 96 MG/DL (ref 70–99)
HDLC SERPL-MCNC: 63 MG/DL
LDLC SERPL CALC-MCNC: 122 MG/DL
NONHDLC SERPL-MCNC: 135 MG/DL
POTASSIUM SERPL-SCNC: 3.7 MMOL/L (ref 3.4–5.3)
PROT SERPL-MCNC: 7.1 G/DL (ref 6.8–8.8)
SODIUM SERPL-SCNC: 143 MMOL/L (ref 133–144)
TRIGL SERPL-MCNC: 67 MG/DL

## 2021-06-17 PROCEDURE — G0463 HOSPITAL OUTPT CLINIC VISIT: HCPCS

## 2021-06-17 PROCEDURE — 80053 COMPREHEN METABOLIC PANEL: CPT | Mod: ZL | Performed by: FAMILY MEDICINE

## 2021-06-17 PROCEDURE — 36415 COLL VENOUS BLD VENIPUNCTURE: CPT | Mod: ZL | Performed by: FAMILY MEDICINE

## 2021-06-17 PROCEDURE — 80061 LIPID PANEL: CPT | Mod: ZL | Performed by: FAMILY MEDICINE

## 2021-06-17 PROCEDURE — 99214 OFFICE O/P EST MOD 30 MIN: CPT | Performed by: FAMILY MEDICINE

## 2021-06-17 ASSESSMENT — ENCOUNTER SYMPTOMS
PALPITATIONS: 0
ABDOMINAL PAIN: 0
CHILLS: 0
SHORTNESS OF BREATH: 0
FEVER: 0

## 2021-06-17 ASSESSMENT — PAIN SCALES - GENERAL: PAINLEVEL: NO PAIN (0)

## 2021-06-17 NOTE — TELEPHONE ENCOUNTER
----- Message from Kaylen Hawkins MD sent at 6/17/2021 11:51 AM CDT -----  Please call with:    Your comprehensive panel is normal, which means normal electrolytes, along with normal kidney and liver function.  Cholesterol panel is improving. Continue to increase your pravastatin as you are able.   Referral to [facility] cardiology has been placed for an angiogram

## 2021-06-21 ENCOUNTER — PATIENT OUTREACH (OUTPATIENT)
Dept: CARE COORDINATION | Facility: OTHER | Age: 71
End: 2021-06-21

## 2021-06-21 NOTE — PROGRESS NOTES
Outgoing call to patient after receiving voicemail that patient would like to pursue an angiogram at [facility]. She stated this was discussed at last office visit with Dr. Waldron on 10-7-2020.  There was no answer on patients phone. Left message that Dr. Waldron is out for the week but will send him her request along with cardiology phone number to call back with any more questions.     Dr. Waldron- would you like to see this pt before sending a referral as her LOV with you was 10-7-2020?  Or would you like to put in the referral without her being seen by you?    Please advise.     Dave CAVANAUGH RN  CHF Care Coordinator   [phone] Option #8  Ext. *[phone]

## 2021-06-30 NOTE — TELEPHONE ENCOUNTER
Called and talked to patient and she stated that her boyfriend is having a port put in on the 16th and she needs to wait and see when he will be doing chemo. Stated that she will get it scheduled after that and call us with the date.

## 2021-06-30 NOTE — PROGRESS NOTES
Nuvia Thompson 2 minutes ago (10:48 AM)        Called and talked to patient and she stated that her boyfriend is having a port put in on the 16th and she needs to wait and see when he will be doing chemo. Stated that she will get it scheduled after that and call us with the date.

## 2021-08-16 DIAGNOSIS — R94.39 ABNORMAL RESULT OF OTHER CARDIOVASCULAR FUNCTION STUDY: Primary | ICD-10-CM

## 2021-08-17 ENCOUNTER — LAB (OUTPATIENT)
Dept: LAB | Facility: OTHER | Age: 71
End: 2021-08-17
Payer: MEDICARE

## 2021-08-17 DIAGNOSIS — R94.39 ABNORMAL RESULT OF OTHER CARDIOVASCULAR FUNCTION STUDY: ICD-10-CM

## 2021-08-17 LAB
ANION GAP SERPL CALCULATED.3IONS-SCNC: 2 MMOL/L (ref 3–14)
BASOPHILS # BLD AUTO: 0 10E3/UL (ref 0–0.2)
BASOPHILS NFR BLD AUTO: 0 %
BUN SERPL-MCNC: 14 MG/DL (ref 7–30)
CALCIUM SERPL-MCNC: 9 MG/DL (ref 8.5–10.1)
CHLORIDE BLD-SCNC: 110 MMOL/L (ref 94–109)
CO2 SERPL-SCNC: 30 MMOL/L (ref 20–32)
CREAT SERPL-MCNC: 1.08 MG/DL (ref 0.52–1.04)
EOSINOPHIL # BLD AUTO: 0.2 10E3/UL (ref 0–0.7)
EOSINOPHIL NFR BLD AUTO: 3 %
ERYTHROCYTE [DISTWIDTH] IN BLOOD BY AUTOMATED COUNT: 11.6 % (ref 10–15)
GFR SERPL CREATININE-BSD FRML MDRD: 53 ML/MIN/1.73M2
GLUCOSE BLD-MCNC: 100 MG/DL (ref 70–99)
HCT VFR BLD AUTO: 38.7 % (ref 35–47)
HGB BLD-MCNC: 13.1 G/DL (ref 11.7–15.7)
IMM GRANULOCYTES # BLD: 0 10E3/UL
IMM GRANULOCYTES NFR BLD: 0 %
LYMPHOCYTES # BLD AUTO: 2.4 10E3/UL (ref 0.8–5.3)
LYMPHOCYTES NFR BLD AUTO: 42 %
MCH RBC QN AUTO: 31.6 PG (ref 26.5–33)
MCHC RBC AUTO-ENTMCNC: 33.9 G/DL (ref 31.5–36.5)
MCV RBC AUTO: 93 FL (ref 78–100)
MONOCYTES # BLD AUTO: 0.4 10E3/UL (ref 0–1.3)
MONOCYTES NFR BLD AUTO: 7 %
NEUTROPHILS # BLD AUTO: 2.7 10E3/UL (ref 1.6–8.3)
NEUTROPHILS NFR BLD AUTO: 48 %
NRBC # BLD AUTO: 0 10E3/UL
NRBC BLD AUTO-RTO: 0 /100
PLATELET # BLD AUTO: 260 10E3/UL (ref 150–450)
POTASSIUM BLD-SCNC: 4.1 MMOL/L (ref 3.4–5.3)
RBC # BLD AUTO: 4.15 10E6/UL (ref 3.8–5.2)
SODIUM SERPL-SCNC: 142 MMOL/L (ref 133–144)
WBC # BLD AUTO: 5.8 10E3/UL (ref 4–11)

## 2021-08-17 PROCEDURE — 36415 COLL VENOUS BLD VENIPUNCTURE: CPT | Mod: ZL

## 2021-08-17 PROCEDURE — 80048 BASIC METABOLIC PNL TOTAL CA: CPT | Mod: ZL

## 2021-08-17 PROCEDURE — 85025 COMPLETE CBC W/AUTO DIFF WBC: CPT | Mod: ZL

## 2021-08-24 ENCOUNTER — TRANSFERRED RECORDS (OUTPATIENT)
Dept: HEALTH INFORMATION MANAGEMENT | Facility: CLINIC | Age: 71
End: 2021-08-24

## 2021-09-01 NOTE — PROGRESS NOTES
Assessment & Plan     Hyperlipidemia LDL goal <100 / Abnormal stress test on 11/27/2019 / Coronary artery disease involving native coronary artery of native heart without angina pectoris / Elevated coronary artery calcium score on 11/22/2019 /   Left bundle branch block (LBBB)  Angiogram report reviewed which shows mild-moderate coronary atherosclerosis   Follow up with Dr. Chivo Templeton Patch d/t symptoms of dizziness and left side chest pain   - c/w pravastatin 10mg every day   - c/w ASA 81mg     Acute cystitis with hematuria  On bactrim with improvement of symptoms  - Urine Culture Aerobic Bacterial; Future    Post-menopausal  - DX Hip/Pelvis/Spine; Future    Dizziness  - Leadless EKG Monitor 8 to 14 Days; Future     See Patient Instructions    Return in about 6 months (around 3/3/2022) for Routine Visit, Lab Work.    Kaylen Hawkins MD  [facility] - [place] Betancur is a 71 year old who presents for the following health issues     HPI       Hyperlipidemia Follow-Up      Are you regularly taking any medication or supplement to lower your cholesterol?   Yes Pravastatin    Are you having muscle aches or other side effects that you think could be caused by your cholesterol lowering medication?  No  LDL (6/17/2021): 122  - pravastatin 10      Vascular Disease / Abnormal stress test on 11/27/2019 / Left bundle branch block (LBBB) / Elevated coronary artery calcium score on 11/22/2019 /  Follow-up      How often do you take nitroglycerin? Never    Do you take an aspirin every day? Yes Low Dose Aspirin 81 mg    - angiogram, Tuesday of last week and per Gypsy's report was negative  - she continues to have left chest pain which radiates to her left arm. She is active, carrying bricks, etc. ?if her pain could be msk     # mood  - SO with cancer. S/p colon resection. Currently undergoing chemo    -  pulmonary nodule - next year CT scan     Review of Systems   Constitutional: Positive  for fatigue. Negative for chills and fever.   HENT: Negative for congestion.    Respiratory: Negative for shortness of breath.    Cardiovascular: Positive for chest pain (chronic and unchanged) and palpitations.   Gastrointestinal: Negative for abdominal pain and heartburn.   Neurological: Positive for dizziness.          Objective    /54 (BP Location: Right arm, Patient Position: Chair, Cuff Size: Adult Regular)   Pulse 67   Temp 98.4  F (36.9  C) (Tympanic)   Resp 18   Wt 63.9 kg (140 lb 12.8 oz)   SpO2 96%   BMI 22.39 kg/m    Body mass index is 22.39 kg/m .  Physical Exam  Constitutional:       General: She is not in acute distress.     Appearance: She is not ill-appearing.   Cardiovascular:      Rate and Rhythm: Normal rate and regular rhythm.      Heart sounds: No murmur heard.     Pulmonary:      Effort: Pulmonary effort is normal. No respiratory distress.      Breath sounds: No wheezing or rales.   Abdominal:      General: Bowel sounds are normal.      Tenderness: There is no abdominal tenderness.   Musculoskeletal:      Right lower leg: No edema.      Left lower leg: No edema.   Neurological:      Mental Status: She is alert.

## 2021-09-02 ENCOUNTER — NURSE TRIAGE (OUTPATIENT)
Dept: FAMILY MEDICINE | Facility: OTHER | Age: 71
End: 2021-09-02

## 2021-09-02 ENCOUNTER — LAB (OUTPATIENT)
Dept: LAB | Facility: OTHER | Age: 71
End: 2021-09-02
Payer: MEDICARE

## 2021-09-02 DIAGNOSIS — N30.01 ACUTE CYSTITIS WITH HEMATURIA: Primary | ICD-10-CM

## 2021-09-02 DIAGNOSIS — N39.0 URINARY TRACT INFECTION: Primary | ICD-10-CM

## 2021-09-02 DIAGNOSIS — N39.0 URINARY TRACT INFECTION: ICD-10-CM

## 2021-09-02 LAB
ALBUMIN UR-MCNC: 50 MG/DL
AMORPH CRY #/AREA URNS HPF: ABNORMAL /HPF
APPEARANCE UR: ABNORMAL
BILIRUB UR QL STRIP: NEGATIVE
COLOR UR AUTO: YELLOW
GLUCOSE UR STRIP-MCNC: NEGATIVE MG/DL
HGB UR QL STRIP: ABNORMAL
KETONES UR STRIP-MCNC: NEGATIVE MG/DL
LEUKOCYTE ESTERASE UR QL STRIP: ABNORMAL
NITRATE UR QL: NEGATIVE
PH UR STRIP: 6 [PH] (ref 4.7–8)
RBC URINE: >182 /HPF
SP GR UR STRIP: 1.02 (ref 1–1.03)
SQUAMOUS EPITHELIAL: 14 /HPF
TRANSITIONAL EPI: 3 /HPF
UROBILINOGEN UR STRIP-MCNC: NORMAL MG/DL
WBC CLUMPS #/AREA URNS HPF: PRESENT /HPF
WBC URINE: >182 /HPF

## 2021-09-02 PROCEDURE — 81001 URINALYSIS AUTO W/SCOPE: CPT | Mod: ZL

## 2021-09-02 RX ORDER — SULFAMETHOXAZOLE/TRIMETHOPRIM 800-160 MG
1 TABLET ORAL 2 TIMES DAILY
Qty: 6 TABLET | Refills: 0 | Status: SHIPPED | OUTPATIENT
Start: 2021-09-02 | End: 2021-09-05

## 2021-09-02 NOTE — TELEPHONE ENCOUNTER
"Per PCP's nurse, okay for lab only. Pt has appt tomorrow with PCP. Allergies reviewed. Uses [facility].     Reason for Disposition    All other patients with painful urination(Exception: [1] EITHER frequency or urgency AND [2] has on-call doctor)    Additional Information    Negative: Shock suspected (e.g., cold/pale/clammy skin, too weak to stand, low BP, rapid pulse)    Negative: Sounds like a life-threatening emergency to the triager    Negative: Followed a genital area injury    Negative: Taking antibiotic for urinary tract infection (UTI)    Negative: Pregnant    Negative: Postpartum (from 0 to 6 weeks after delivery)    Negative: [1] Unable to urinate (or only a few drops) > 4 hours AND [2] bladder feels very full (e.g., palpable bladder or strong urge to urinate)    Negative: Vomiting    Negative: Patient sounds very sick or weak to the triager    Negative: [1] SEVERE pain with urination  (e.g., excruciating) AND [2] not improved after 2 hours of pain medicine and Sitz bath    Negative: Fever > 100.4 F (38.0 C)    Negative: Side (flank) or lower back pain present    Negative: Diabetes mellitus or weak immune system (e.g., HIV positive, cancer chemo, splenectomy, organ transplant, chronic steroids)    Negative: Bedridden (e.g., nursing home patient, CVA, chronic illness, recovering from surgery)    Negative: Artificial heart valve or artificial joint    Negative: Unusual vaginal discharge (e.g., bad smelling, yellow, green, or foamy-white)    Negative: Patient is worried about sexually transmitted disease (STD)    Negative: Possibility of pregnancy    Negative: Blood in urine (red, pink, or tea-colored)    Negative: Age > 50 years    Negative: > 2 UTI's in last year    Answer Assessment - Initial Assessment Questions  1. SEVERITY: \"How bad is the pain?\"  (e.g., Scale 1-10; mild, moderate, or severe)    - MILD (1-3): complains slightly about urination hurting    - MODERATE (4-7): interferes with normal " "activities      - SEVERE (8-10): excruciating, unwilling or unable to urinate because of the pain       Moderate, burning  2. FREQUENCY: \"How many times have you had painful urination today?\"       Every time  3. PATTERN: \"Is pain present every time you urinate or just sometimes?\"       Every time  4. ONSET: \"When did the painful urination start?\"       Early this AM  5. FEVER: \"Do you have a fever?\" If so, ask: \"What is your temperature, how was it measured, and when did it start?\"      no  6. PAST UTI: \"Have you had a urine infection before?\" If so, ask: \"When was the last time?\" and \"What happened that time?\"       Yes but it has been years  7. CAUSE: \"What do you think is causing the painful urination?\"  (e.g., UTI, scratch, Herpes sore)      UTI?  8. OTHER SYMPTOMS: \"Do you have any other symptoms?\" (e.g., flank pain, vaginal discharge, genital sores, urgency, blood in urine)      Pressure, frequency, urgency  9. PREGNANCY: \"Is there any chance you are pregnant?\" \"When was your last menstrual period?\"      na    Protocols used: URINATION PAIN - FEMALE-A-AH      "

## 2021-09-03 ENCOUNTER — OFFICE VISIT (OUTPATIENT)
Dept: FAMILY MEDICINE | Facility: OTHER | Age: 71
End: 2021-09-03
Attending: FAMILY MEDICINE
Payer: MEDICARE

## 2021-09-03 VITALS
SYSTOLIC BLOOD PRESSURE: 122 MMHG | OXYGEN SATURATION: 96 % | RESPIRATION RATE: 18 BRPM | DIASTOLIC BLOOD PRESSURE: 54 MMHG | HEART RATE: 67 BPM | WEIGHT: 140.8 LBS | TEMPERATURE: 98.4 F | BODY MASS INDEX: 22.39 KG/M2

## 2021-09-03 DIAGNOSIS — R93.1 ELEVATED CORONARY ARTERY CALCIUM SCORE: ICD-10-CM

## 2021-09-03 DIAGNOSIS — R94.39 ABNORMAL STRESS TEST: ICD-10-CM

## 2021-09-03 DIAGNOSIS — N30.01 ACUTE CYSTITIS WITH HEMATURIA: ICD-10-CM

## 2021-09-03 DIAGNOSIS — I25.10 CORONARY ARTERY DISEASE INVOLVING NATIVE CORONARY ARTERY OF NATIVE HEART WITHOUT ANGINA PECTORIS: ICD-10-CM

## 2021-09-03 DIAGNOSIS — E78.5 HYPERLIPIDEMIA LDL GOAL <100: Primary | ICD-10-CM

## 2021-09-03 DIAGNOSIS — R42 DIZZINESS: ICD-10-CM

## 2021-09-03 DIAGNOSIS — Z78.0 POST-MENOPAUSAL: ICD-10-CM

## 2021-09-03 DIAGNOSIS — I44.7 LEFT BUNDLE BRANCH BLOCK (LBBB): ICD-10-CM

## 2021-09-03 PROCEDURE — G0463 HOSPITAL OUTPT CLINIC VISIT: HCPCS

## 2021-09-03 PROCEDURE — G0463 HOSPITAL OUTPT CLINIC VISIT: HCPCS | Mod: 25

## 2021-09-03 PROCEDURE — 99214 OFFICE O/P EST MOD 30 MIN: CPT | Performed by: FAMILY MEDICINE

## 2021-09-03 ASSESSMENT — ENCOUNTER SYMPTOMS
HEARTBURN: 0
DIZZINESS: 1
CHILLS: 0
SHORTNESS OF BREATH: 0
FEVER: 0
FATIGUE: 1
ABDOMINAL PAIN: 0
PALPITATIONS: 1

## 2021-09-03 ASSESSMENT — PAIN SCALES - GENERAL: PAINLEVEL: NO PAIN (0)

## 2021-09-03 NOTE — NURSING NOTE
"Chief Complaint   Patient presents with     Lipids     Vascular Disease       Initial /54 (BP Location: Right arm, Patient Position: Chair, Cuff Size: Adult Regular)   Pulse 67   Temp 98.4  F (36.9  C) (Tympanic)   Resp 18   Wt 63.9 kg (140 lb 12.8 oz)   SpO2 96%   BMI 22.39 kg/m   Estimated body mass index is 22.39 kg/m  as calculated from the following:    Height as of 9/10/20: 1.689 m (5' 6.5\").    Weight as of this encounter: 63.9 kg (140 lb 12.8 oz).  Medication Reconciliation: complete  Merle Matamoros LPN  "

## 2021-09-12 ENCOUNTER — HEALTH MAINTENANCE LETTER (OUTPATIENT)
Age: 71
End: 2021-09-12

## 2021-09-16 ENCOUNTER — ANCILLARY PROCEDURE (OUTPATIENT)
Dept: MAMMOGRAPHY | Facility: OTHER | Age: 71
End: 2021-09-16
Attending: FAMILY MEDICINE
Payer: MEDICARE

## 2021-09-16 DIAGNOSIS — Z12.31 VISIT FOR SCREENING MAMMOGRAM: ICD-10-CM

## 2021-09-16 PROCEDURE — 77063 BREAST TOMOSYNTHESIS BI: CPT | Mod: TC

## 2021-09-20 ENCOUNTER — OFFICE VISIT (OUTPATIENT)
Dept: CARDIOLOGY | Facility: OTHER | Age: 71
End: 2021-09-20
Attending: INTERNAL MEDICINE
Payer: MEDICARE

## 2021-09-20 VITALS
HEART RATE: 71 BPM | TEMPERATURE: 97.7 F | BODY MASS INDEX: 23.07 KG/M2 | DIASTOLIC BLOOD PRESSURE: 71 MMHG | OXYGEN SATURATION: 95 % | WEIGHT: 147 LBS | HEIGHT: 67 IN | SYSTOLIC BLOOD PRESSURE: 129 MMHG

## 2021-09-20 DIAGNOSIS — R07.89 OTHER CHEST PAIN: ICD-10-CM

## 2021-09-20 DIAGNOSIS — Z98.890 H/O CARDIAC CATHETERIZATION: Primary | ICD-10-CM

## 2021-09-20 DIAGNOSIS — E78.5 HYPERLIPIDEMIA LDL GOAL <100: ICD-10-CM

## 2021-09-20 DIAGNOSIS — N18.31 STAGE 3A CHRONIC KIDNEY DISEASE (H): ICD-10-CM

## 2021-09-20 DIAGNOSIS — R93.1 ELEVATED CORONARY ARTERY CALCIUM SCORE: ICD-10-CM

## 2021-09-20 DIAGNOSIS — R94.39 ABNORMAL STRESS TEST: ICD-10-CM

## 2021-09-20 DIAGNOSIS — I25.10 CORONARY ARTERY DISEASE INVOLVING NATIVE CORONARY ARTERY OF NATIVE HEART WITHOUT ANGINA PECTORIS: ICD-10-CM

## 2021-09-20 DIAGNOSIS — I44.7 LEFT BUNDLE BRANCH BLOCK (LBBB): ICD-10-CM

## 2021-09-20 DIAGNOSIS — E78.2 MIXED HYPERLIPIDEMIA: ICD-10-CM

## 2021-09-20 PROBLEM — N18.30 CHRONIC KIDNEY DISEASE, STAGE 3 (H): Status: ACTIVE | Noted: 2021-09-20

## 2021-09-20 PROCEDURE — 99214 OFFICE O/P EST MOD 30 MIN: CPT | Performed by: INTERNAL MEDICINE

## 2021-09-20 PROCEDURE — G0463 HOSPITAL OUTPT CLINIC VISIT: HCPCS

## 2021-09-20 ASSESSMENT — PAIN SCALES - GENERAL: PAINLEVEL: NO PAIN (0)

## 2021-09-20 ASSESSMENT — ANXIETY QUESTIONNAIRES
GAD7 TOTAL SCORE: 0
3. WORRYING TOO MUCH ABOUT DIFFERENT THINGS: NOT AT ALL
1. FEELING NERVOUS, ANXIOUS, OR ON EDGE: NOT AT ALL
5. BEING SO RESTLESS THAT IT IS HARD TO SIT STILL: NOT AT ALL
7. FEELING AFRAID AS IF SOMETHING AWFUL MIGHT HAPPEN: NOT AT ALL
4. TROUBLE RELAXING: NOT AT ALL
2. NOT BEING ABLE TO STOP OR CONTROL WORRYING: NOT AT ALL
6. BECOMING EASILY ANNOYED OR IRRITABLE: NOT AT ALL

## 2021-09-20 ASSESSMENT — PATIENT HEALTH QUESTIONNAIRE - PHQ9: SUM OF ALL RESPONSES TO PHQ QUESTIONS 1-9: 0

## 2021-09-20 ASSESSMENT — MIFFLIN-ST. JEOR: SCORE: 1224.42

## 2021-09-20 NOTE — PATIENT INSTRUCTIONS
Thank you for allowing Dr. Waldron and our  team to participate in your care. Please call our office at [phone] with scheduling questions or if you need to cancel or change your appointment. With any other questions or concerns you may call Evelyn cardiology nurse at [phone].       If you experience chest pain, chest pressure, chest tightness, shortness of breath, fainting, lightheadedness, nausea, vomiting, or other concerning symptoms, please report to the Emergency Department or call 911. These symptoms may be emergent, and best treated in the Emergency Department.    Follow up in

## 2021-09-20 NOTE — PROGRESS NOTES
[facility] HEART [facility]   CARDIOLOGY PROGRESS NOTE     Chief Complaint   Patient presents with     RECHECK          Diagnosis:  1.  S/P cardiac cath at [facility] on 8/24/2021. LAD with 40%. Remaining vessels with mild disease.  2.  Abnormal stress test on 11/27/2019 involving apical and septal regions.  3.  Elevated coronary calcium score to 67 on 11/22/2019.  4.  Hyperlipidemia-borderline control.  5.  Family history of heart disease.  6.  LBBB.  7.  Right upper lobe nodule on 11/22/2019.  8.  CKD 3.  9.  Dizziness-poor fluid consumption.      Assessment/Plan:   1.  Continue aspirin 81 mg chewable for the rest of her life.  2.  Reviewed her cardiac catheterization at [facility].  Patient with mild disease.  Results explained.  3.  Increase her fluid intake related to dizziness.  4.  Continue on aspirin 81 mg daily and pravastatin 20 mg daily.  5.  Follow-up in the future on as-needed basis.       Interval history:  Gypsy is being seen in follow-up to a cardiac catheterization at [facility].  She was seen on 10/7/2020 after having a CTA of her coronaries.  LAD was found to have a dense proximal disease with a fixed apical/septal defect.  She was having chest pain.  A cardiac catheterization was recommended at that time but never completed.  She contacted the clinic on 6/21/2021 stating she wanted to pursue an angiogram.  She wanted to have it at [facility].  Referral placed.  Procedure performed.  Patient with mild disease as outlined above.  Results were reviewed today.  We also reviewed her medications.  She states she has been dizzy but consumes poor amounts of fluid.  She was encouraged to increase her fluid intake.  She essentially has no additional concerns or complaints.  Being that she has no significant cardiac issues, she will be seen in the future on as-needed basis.      HPI:    Mrs. Blackburn is being seen by cardiology for chest pain, abnormal stress testing, and elevated calcium score on a CTA of the  "coronaries.  There is also history of hyperlipidemia, family history of heart disease, left bundle branch block, right upper lobe pulmonary nodule, and elevated calcium score.     At initial evaluation, she endorsed chest pain.  She describes a twinge-like pain to her chest lasting seconds.  Periodically, she will have a tightness in her neck.  She states the tightness to her neck will happen 1-2 times a month.  These symptoms will happen in the middle the night.  This symptom is not necessarily activity driven.  She reports the previous day raking and lifting bricks.  She did not have concerning anginal symptoms but was considerably dyspneic.  She states she is significantly short of breath and winded with these activities.  She denies arm, jaw, shoulder, significant heartburn, or intrascapular pain.  Risk factors include hyperlipidemia which she has been hesitant to start statins, family history of heart disease but denies diabetes, hypertension, history of tobacco abuse, obesity, sedentary lifestyle, and poor diet.  She has been on statins before and has had some leg cramping.  She states she would not be willing to take most prescribed medications.     She had a stress test on 11/27/2019 showing a fixed apical and septal defect with an EF of 69%.     CTA of coronaries on 11/22/2019 showed extra thick calcium involving the LAD with a total calcium score of 267.  Left main with atherosclerotic plaquing, circumflex and marginal branches patent, and RCA and marginal branches normal.     We discussed her findings of heart disease and recommendations of a cardiac catheterization as she is having symptoms but not definitively cardiac related.  She was noted to have left bundle branch block previously.  She has been resistant to medications but takes aspirin 81 mg \"when she remembers \".  She is not taking pravastatin consistently secondary to leg cramping.  Her ACC/AHA 10-year risk was 8.9% which was showed to the " patient and explained.  This warrants moderate to high intensity statin.  After this discussion, she stated she would take 1/4 tablet of pravastatin 3 times a week.  I recommended cardiac catheterization but patient not willing to travel or willing to consider to have the procedure done.  She was told to think about it and let us know if she would like to move forward.      Relevant testing:  Stress test on 11/27/19:     The nuclear stress test is abnormal.  There is a fixed apical and septal abnormality.  No reversible ischemia is noted     The left ventricular ejection fraction at stress is 69%.     A prior study was conducted on 9/24/2013.     Nondiagnositc Stress ECG due to LBBB     CTA angiogram on 11/22/2019:  Extremely dense calcification in the proximal left  anterior descending artery. The degree of stenosis cannot be  determined due to the dense calcifications. No other coronary artery  stenoses or occlusions are seen.        ICD-10-CM    1. H/O cardiac catheterization at [facility] on 8/24/2021.  Z98.890    2. Other chest pain  R07.89    3. Abnormal stress test on 11/27/2019  R94.39    4. Elevated coronary artery calcium score on 11/22/2019  R93.1    5. Coronary artery disease involving native coronary artery of native heart without angina pectoris  I25.10    6. Mixed hyperlipidemia  E78.2    7. Hyperlipidemia LDL goal <100  E78.5    8. Left bundle branch block (LBBB)  I44.7    9. Stage 3a chronic kidney disease  N18.31        No past medical history on file.    Past Surgical History:   Procedure Laterality Date     APPENDECTOMY  1964     ARTHROSCOPY KNEE Left 1989     HYSTERECTOMY  1974     HYSTERECTOMY TOTAL ABDOMINAL  2001     TONSILLECTOMY  1954       Allergies   Allergen Reactions     Bee Venom      unknown reaction     Iodine      unknown reaction     Penicillins      Childhood     Etodolac Rash       Current Outpatient Medications   Medication Sig Dispense Refill     aspirin (ASA) 81 MG chewable  tablet Take 1 tablet (81 mg) by mouth daily 90 tablet 3     B Complex-Biotin-FA (SUPER B-COMPLEX) TABS Take by mouth daily        CALCIUM PO Take 600 mg by mouth daily        Cholecalciferol (VITAMIN D3) 50 MCG (2000 UT) CAPS 5,000 Units daily        diclofenac (VOLTAREN) 1 % topical gel Apply 2 g topically 4 times daily as needed for moderate pain 350 g 1     Multiple Vitamins-Minerals (HAIR SKIN AND NAILS FORMULA) TABS        multivitamin w/minerals (MULTI-VITAMIN) tablet Take 1 tablet by mouth daily       pravastatin (PRAVACHOL) 20 MG tablet Take 1 tablet (20 mg) by mouth daily Take full pill if tolerated. 30 tablet 3     Turmeric (QC TUMERIC COMPLEX PO)        UNABLE TO FIND MEDICATION NAME: Potassium OTC       UNABLE TO FIND 2 drops MEDICATION NAME: chugga 2 drops in water .         Social History     Socioeconomic History     Marital status:      Spouse name: Not on file     Number of children: 3     Years of education: Not on file     Highest education level: Not on file   Occupational History     Not on file   Tobacco Use     Smoking status: Never Smoker     Smokeless tobacco: Never Used   Substance and Sexual Activity     Alcohol use: Not Currently     Comment: former     Drug use: Never     Sexual activity: Not on file   Other Topics Concern     Not on file   Social History Narrative     Not on file     Social Determinants of Health     Financial Resource Strain:      Difficulty of Paying Living Expenses:    Food Insecurity:      Worried About Running Out of Food in the Last Year:      Ran Out of Food in the Last Year:    Transportation Needs:      Lack of Transportation (Medical):      Lack of Transportation (Non-Medical):    Physical Activity:      Days of Exercise per Week:      Minutes of Exercise per Session:    Stress:      Feeling of Stress :    Social Connections:      Frequency of Communication with Friends and Family:      Frequency of Social Gatherings with Friends and Family:       Attends [employer] Services:      Active Member of Clubs or Organizations:      Attends Club or Organization Meetings:      Marital Status:    Intimate Partner Violence:      Fear of Current or Ex-Partner:      Emotionally Abused:      Physically Abused:      Sexually Abused:        LAB RESULTS:   Lab on 09/02/2021   Component Date Value Ref Range Status     Color Urine 09/02/2021 Yellow  Colorless, Straw, Light Yellow, Yellow Final     Appearance Urine 09/02/2021 Cloudy* Clear Final     Glucose Urine 09/02/2021 Negative  Negative mg/dL Final     Bilirubin Urine 09/02/2021 Negative  Negative Final     Ketones Urine 09/02/2021 Negative  Negative mg/dL Final     Specific Gravity Urine 09/02/2021 1.016  1.003 - 1.035 Final     Blood Urine 09/02/2021 Large* Negative Final     pH Urine 09/02/2021 6.0  4.7 - 8.0 Final     Protein Albumin Urine 09/02/2021 50 * Negative mg/dL Final     Urobilinogen Urine 09/02/2021 Normal  Normal, 2.0 mg/dL Final     Nitrite Urine 09/02/2021 Negative  Negative Final     Leukocyte Esterase Urine 09/02/2021 Large* Negative Final     WBC Clumps Urine 09/02/2021 Present* None Seen /HPF Final     Amorphous Crystals Urine 09/02/2021 Few* None Seen /HPF Final     RBC Urine 09/02/2021 >182* <=2 /HPF Final     WBC Urine 09/02/2021 >182* <=5 /HPF Final     Squamous Epithelials Urine 09/02/2021 14* <=1 /HPF Final     Transitional Epithelials Urine 09/02/2021 3* <=1 /HPF Final   Lab on 08/17/2021   Component Date Value Ref Range Status     Sodium 08/17/2021 142  133 - 144 mmol/L Final     Potassium 08/17/2021 4.1  3.4 - 5.3 mmol/L Final     Chloride 08/17/2021 110* 94 - 109 mmol/L Final     Carbon Dioxide (CO2) 08/17/2021 30  20 - 32 mmol/L Final     Anion Gap 08/17/2021 2* 3 - 14 mmol/L Final     Urea Nitrogen 08/17/2021 14  7 - 30 mg/dL Final     Creatinine 08/17/2021 1.08* 0.52 - 1.04 mg/dL Final     Calcium 08/17/2021 9.0  8.5 - 10.1 mg/dL Final     Glucose 08/17/2021 100* 70 - 99 mg/dL Final      "GFR Estimate 08/17/2021 53* >60 mL/min/1.73m2 Final     WBC Count 08/17/2021 5.8  4.0 - 11.0 10e3/uL Final     RBC Count 08/17/2021 4.15  3.80 - 5.20 10e6/uL Final     Hemoglobin 08/17/2021 13.1  11.7 - 15.7 g/dL Final     Hematocrit 08/17/2021 38.7  35.0 - 47.0 % Final     MCV 08/17/2021 93  78 - 100 fL Final     MCH 08/17/2021 31.6  26.5 - 33.0 pg Final     MCHC 08/17/2021 33.9  31.5 - 36.5 g/dL Final     RDW 08/17/2021 11.6  10.0 - 15.0 % Final     Platelet Count 08/17/2021 260  150 - 450 10e3/uL Final     % Neutrophils 08/17/2021 48  % Final     % Lymphocytes 08/17/2021 42  % Final     % Monocytes 08/17/2021 7  % Final     % Eosinophils 08/17/2021 3  % Final     % Basophils 08/17/2021 0  % Final     % Immature Granulocytes 08/17/2021 0  % Final     NRBCs per 100 WBC 08/17/2021 0  <1 /100 Final     Absolute Neutrophils 08/17/2021 2.7  1.6 - 8.3 10e3/uL Final     Absolute Lymphocytes 08/17/2021 2.4  0.8 - 5.3 10e3/uL Final     Absolute Monocytes 08/17/2021 0.4  0.0 - 1.3 10e3/uL Final     Absolute Eosinophils 08/17/2021 0.2  0.0 - 0.7 10e3/uL Final     Absolute Basophils 08/17/2021 0.0  0.0 - 0.2 10e3/uL Final     Absolute Immature Granulocytes 08/17/2021 0.0  <=0.0 10e3/uL Final     Absolute NRBCs 08/17/2021 0.0  10e3/uL Final        Review of systems: Negative except that which was noted in the HPI.    Physical examination:  /71   Pulse 71   Temp 97.7  F (36.5  C)   Ht 1.702 m (5' 7\")   Wt 66.7 kg (147 lb)   SpO2 95%   BMI 23.02 kg/m      GENERAL APPEARANCE: healthy, alert and no distress  HEENT: no icterus, no xanthelasmas, normal pupil size and reaction, no cyanosis.  NECK: no adenopathy, no asymmetry, masses.  CHEST: lungs clear to auscultation - no rales, rhonchi or wheezes, no use of accessory muscles, no retractions, respirations are unlabored, normal respiratory rate  CARDIOVASCULAR: regular rhythm, normal S1 with physiologic split S2, no S3 or S4 and no murmur, click or rub  EXTREMITIES: no " clubbing, cyanosis or edema  NEURO: alert and oriented normal speech, and affect  VASC: No vascular bruits heard.  SKIN: no ecchymoses, no rashes        Thank you for allowing me to participate in the care of your patient. Please do not hesitate to contact me if you have any questions.     Abdulkadir Waldron, DO

## 2021-09-20 NOTE — NURSING NOTE
"Chief Complaint   Patient presents with     RECHECK       Initial /71   Pulse 71   Temp 97.7  F (36.5  C)   Ht 1.702 m (5' 7\")   Wt 66.7 kg (147 lb)   SpO2 95%   BMI 23.02 kg/m   Estimated body mass index is 23.02 kg/m  as calculated from the following:    Height as of this encounter: 1.702 m (5' 7\").    Weight as of this encounter: 66.7 kg (147 lb).  Medication Reconciliation: complete  Sarabjit Mcelroy    "

## 2021-09-21 ENCOUNTER — HOSPITAL ENCOUNTER (OUTPATIENT)
Dept: BONE DENSITY | Facility: HOSPITAL | Age: 71
Discharge: HOME | End: 2021-09-21
Attending: FAMILY MEDICINE | Admitting: FAMILY MEDICINE
Payer: MEDICARE

## 2021-09-21 DIAGNOSIS — Z78.0 POST-MENOPAUSAL: ICD-10-CM

## 2021-09-21 PROCEDURE — 77080 DXA BONE DENSITY AXIAL: CPT

## 2021-09-21 ASSESSMENT — ANXIETY QUESTIONNAIRES: GAD7 TOTAL SCORE: 0

## 2021-10-20 DIAGNOSIS — R94.39 ABNORMAL STRESS TEST: ICD-10-CM

## 2021-10-20 DIAGNOSIS — R93.1 ELEVATED CORONARY ARTERY CALCIUM SCORE: ICD-10-CM

## 2021-10-20 DIAGNOSIS — Z82.49 FAMILY HISTORY OF HEART DISEASE: ICD-10-CM

## 2021-10-20 DIAGNOSIS — R07.9 CHEST PAIN, UNSPECIFIED TYPE: ICD-10-CM

## 2021-10-21 RX ORDER — ASPIRIN 81 MG
TABLET,CHEWABLE ORAL
Qty: 90 TABLET | Refills: 3 | Status: SHIPPED | OUTPATIENT
Start: 2021-10-21 | End: 2023-01-19

## 2021-10-21 NOTE — TELEPHONE ENCOUNTER
ASA      Last Written Prescription Date:  10/7/2020  Last Fill Quantity: 90,   # refills: 3  Last Office Visit: 9/3/2021  Future Office visit:    Next 5 appointments (look out 90 days)    Dec 17, 2021  2:00 PM  (Arrive by 1:45 PM)  SHORT with Kaylen Hawkins MD  [facility] [place] ([facility] [place] ) [address]  [place] [address]  [phone]

## 2021-12-17 ENCOUNTER — OFFICE VISIT (OUTPATIENT)
Dept: FAMILY MEDICINE | Facility: OTHER | Age: 71
End: 2021-12-17
Attending: FAMILY MEDICINE
Payer: MEDICARE

## 2021-12-17 VITALS
RESPIRATION RATE: 18 BRPM | HEART RATE: 66 BPM | SYSTOLIC BLOOD PRESSURE: 140 MMHG | OXYGEN SATURATION: 97 % | TEMPERATURE: 98.1 F | WEIGHT: 139.4 LBS | BODY MASS INDEX: 21.83 KG/M2 | DIASTOLIC BLOOD PRESSURE: 58 MMHG

## 2021-12-17 DIAGNOSIS — E78.5 HYPERLIPIDEMIA LDL GOAL <100: Primary | ICD-10-CM

## 2021-12-17 PROCEDURE — 99213 OFFICE O/P EST LOW 20 MIN: CPT | Performed by: FAMILY MEDICINE

## 2021-12-17 PROCEDURE — G0463 HOSPITAL OUTPT CLINIC VISIT: HCPCS

## 2021-12-17 ASSESSMENT — ENCOUNTER SYMPTOMS
FEVER: 0
ABDOMINAL PAIN: 0
SHORTNESS OF BREATH: 0
DIZZINESS: 1
MYALGIAS: 0
CHILLS: 0
PALPITATIONS: 0

## 2021-12-17 ASSESSMENT — PAIN SCALES - GENERAL: PAINLEVEL: NO PAIN (0)

## 2021-12-17 NOTE — NURSING NOTE
"Chief Complaint   Patient presents with     Lipids       Initial BP (!) 140/58   Pulse 66   Temp 98.1  F (36.7  C)   Resp 18   Wt 63.2 kg (139 lb 6.4 oz)   SpO2 97%   BMI 21.83 kg/m   Estimated body mass index is 21.83 kg/m  as calculated from the following:    Height as of 9/20/21: 1.702 m (5' 7\").    Weight as of this encounter: 63.2 kg (139 lb 6.4 oz).  Medication Reconciliation: azeb Ward  "

## 2021-12-17 NOTE — PROGRESS NOTES
Assessment & Plan     Hyperlipidemia LDL goal <100  Recheck lipid panel in one months. Cramping resolved after stopping simvastatin   - Lipid Profile (Chol, Trig, HDL, LDL calc); Future  - Basic metabolic panel; Future  - discontinue pravastatin for now  - c/w fish oil  - if repeat lipid panel not at goal, consideration for zetia    0956}  See Patient Instructions    Return in about 1 year (around 12/17/2022) for Lab Work, Physical Exam.    Kaylen Hawkins MD  [facility] - [place] Betancur is a 71 year old who presents for the following health issues     HPI       Hyperlipidemia Follow-Up//Pt has not taken in 14 days d/t taking zinzino oil       Are you regularly taking any medication or supplement to lower your cholesterol?   No    Are you having muscle aches or other side effects that you think could be caused by your cholesterol lowering medication?  n/a    - Zinzino (fish oil) since August   - not taking pravastatin,   - cramping is gone   - increasing water has resolved her chest pain     # Dizziness: resolved      BP Readings from Last 6 Encounters:   12/17/21 (!) 140/58   09/20/21 129/71   09/03/21 122/54   06/17/21 122/52   12/11/20 120/62   10/07/20 135/75       Review of Systems   Constitutional: Negative for chills and fever.   HENT: Negative for congestion.    Respiratory: Negative for shortness of breath.    Cardiovascular: Negative for chest pain (resovled) and palpitations.   Gastrointestinal: Negative for abdominal pain.   Musculoskeletal: Negative for myalgias.   Neurological: Positive for dizziness.          Objective    BP (!) 140/58   Pulse 66   Temp 98.1  F (36.7  C)   Resp 18   Wt 63.2 kg (139 lb 6.4 oz)   SpO2 97%   BMI 21.83 kg/m    Body mass index is 21.83 kg/m .  Physical Exam  Constitutional:       General: She is not in acute distress.     Appearance: She is not ill-appearing.   Cardiovascular:      Rate and Rhythm: Normal rate and regular rhythm.       Heart sounds: No murmur heard.      Pulmonary:      Effort: Pulmonary effort is normal. No respiratory distress.      Breath sounds: No wheezing or rales.   Neurological:      Mental Status: She is alert.   Psychiatric:         Mood and Affect: Mood normal.        Recent Labs   Lab Test 06/17/21  1120 09/10/20  1035   CHOL 198 223*   HDL 63 53   * 146*   TRIG 67 118

## 2022-01-02 ENCOUNTER — HEALTH MAINTENANCE LETTER (OUTPATIENT)
Age: 72
End: 2022-01-02

## 2022-01-03 DIAGNOSIS — R91.1 RIGHT LOWER LOBE PULMONARY NODULE: Primary | ICD-10-CM

## 2022-04-12 ENCOUNTER — HOSPITAL ENCOUNTER (OUTPATIENT)
Dept: CT IMAGING | Facility: HOSPITAL | Age: 72
Discharge: HOME | End: 2022-04-12
Attending: FAMILY MEDICINE | Admitting: FAMILY MEDICINE
Payer: MEDICARE

## 2022-04-12 ENCOUNTER — LAB (OUTPATIENT)
Dept: LAB | Facility: OTHER | Age: 72
End: 2022-04-12
Payer: MEDICARE

## 2022-04-12 DIAGNOSIS — R91.1 RIGHT LOWER LOBE PULMONARY NODULE: ICD-10-CM

## 2022-04-12 DIAGNOSIS — E78.5 HYPERLIPIDEMIA LDL GOAL <100: ICD-10-CM

## 2022-04-12 LAB
ANION GAP SERPL CALCULATED.3IONS-SCNC: 1 MMOL/L (ref 3–14)
BUN SERPL-MCNC: 21 MG/DL (ref 7–30)
CALCIUM SERPL-MCNC: 9.1 MG/DL (ref 8.5–10.1)
CHLORIDE BLD-SCNC: 111 MMOL/L (ref 94–109)
CHOLEST SERPL-MCNC: 264 MG/DL
CO2 SERPL-SCNC: 29 MMOL/L (ref 20–32)
CREAT SERPL-MCNC: 0.96 MG/DL (ref 0.52–1.04)
FASTING STATUS PATIENT QL REPORTED: YES
GFR SERPL CREATININE-BSD FRML MDRD: 63 ML/MIN/1.73M2
GLUCOSE BLD-MCNC: 96 MG/DL (ref 70–99)
HDLC SERPL-MCNC: 55 MG/DL
LDLC SERPL CALC-MCNC: 191 MG/DL
NONHDLC SERPL-MCNC: 209 MG/DL
POTASSIUM BLD-SCNC: 3.7 MMOL/L (ref 3.4–5.3)
SODIUM SERPL-SCNC: 141 MMOL/L (ref 133–144)
TRIGL SERPL-MCNC: 90 MG/DL

## 2022-04-12 PROCEDURE — 80061 LIPID PANEL: CPT | Mod: ZL

## 2022-04-12 PROCEDURE — 71250 CT THORAX DX C-: CPT | Mod: ME

## 2022-04-12 PROCEDURE — 80048 BASIC METABOLIC PNL TOTAL CA: CPT | Mod: ZL

## 2022-04-12 PROCEDURE — 36415 COLL VENOUS BLD VENIPUNCTURE: CPT | Mod: ZL

## 2022-04-12 NOTE — LETTER
April 13, 2022      Gypsy Blackburn  [address] [place] [address]        Dear ,    We are writing to inform you of your test results.  There is a Significant increase in cholesterol panel  Strong recommendation for statin medication, zetia, or referral back to cardiology for consideration of injectable medication for cholesterol   Please let me know which option you would like to pursue.      Resulted Orders   Basic metabolic panel   Result Value Ref Range    Sodium 141 133 - 144 mmol/L    Potassium 3.7 3.4 - 5.3 mmol/L    Chloride 111 (H) 94 - 109 mmol/L    Carbon Dioxide (CO2) 29 20 - 32 mmol/L    Anion Gap 1 (L) 3 - 14 mmol/L    Urea Nitrogen 21 7 - 30 mg/dL    Creatinine 0.96 0.52 - 1.04 mg/dL    Calcium 9.1 8.5 - 10.1 mg/dL    Glucose 96 70 - 99 mg/dL    GFR Estimate 63 >60 mL/min/1.73m2      Comment:      Effective December 21, 2021 eGFRcr in adults is calculated using the 2021 CKD-EPI creatinine equation which includes age and gender (Zoey butterfield al., NEJM, DOI: 10.1056/NGSWoi6544189)   Lipid Profile (Chol, Trig, HDL, LDL calc)   Result Value Ref Range    Cholesterol 264 (H) <200 mg/dL    Triglycerides 90 <150 mg/dL    Direct Measure HDL 55 >=50 mg/dL    LDL Cholesterol Calculated 191 (H) <=100 mg/dL    Non HDL Cholesterol 209 (H) <130 mg/dL    Patient Fasting > 8hrs? Yes     Narrative    Cholesterol  Desirable:  <200 mg/dL    Triglycerides  Normal:  Less than 150 mg/dL  Borderline High:  150-199 mg/dL  High:  200-499 mg/dL  Very High:  Greater than or equal to 500 mg/dL    Direct Measure HDL  Female:  Greater than or equal to 50 mg/dL   Male:  Greater than or equal to 40 mg/dL    LDL Cholesterol  Desirable:  <100mg/dL  Above Desirable:  100-129 mg/dL   Borderline High:  130-159 mg/dL   High:  160-189 mg/dL   Very High:  >= 190 mg/dL    Non HDL Cholesterol  Desirable:  130 mg/dL  Above Desirable:  130-159 mg/dL  Borderline High:  160-189 mg/dL  High:  190-219 mg/dL  Very High:  Greater than or  equal to 220 mg/dL       If you have any questions or concerns, please call the clinic at the number listed above.       Sincerely,      Kaylen Hawkins MD

## 2022-04-18 DIAGNOSIS — E78.5 HYPERLIPIDEMIA LDL GOAL <100: Primary | ICD-10-CM

## 2022-06-17 ENCOUNTER — LAB (OUTPATIENT)
Dept: LAB | Facility: OTHER | Age: 72
End: 2022-06-17
Payer: MEDICARE

## 2022-06-17 DIAGNOSIS — E78.5 HYPERLIPIDEMIA LDL GOAL <100: ICD-10-CM

## 2022-06-17 LAB
CHOLEST SERPL-MCNC: 210 MG/DL
FASTING STATUS PATIENT QL REPORTED: YES
HDLC SERPL-MCNC: 49 MG/DL
LDLC SERPL CALC-MCNC: 141 MG/DL
NONHDLC SERPL-MCNC: 161 MG/DL
TRIGL SERPL-MCNC: 99 MG/DL

## 2022-06-17 PROCEDURE — 36415 COLL VENOUS BLD VENIPUNCTURE: CPT | Mod: ZL

## 2022-06-17 PROCEDURE — 80061 LIPID PANEL: CPT | Mod: ZL

## 2022-11-19 ENCOUNTER — HEALTH MAINTENANCE LETTER (OUTPATIENT)
Age: 72
End: 2022-11-19

## 2023-04-09 ENCOUNTER — HEALTH MAINTENANCE LETTER (OUTPATIENT)
Age: 73
End: 2023-04-09

## 2023-09-07 ENCOUNTER — TELEPHONE (OUTPATIENT)
Dept: FAMILY MEDICINE | Facility: OTHER | Age: 73
End: 2023-09-07

## 2023-09-07 NOTE — TELEPHONE ENCOUNTER
"Call placed to patient to discuss positive covid 19 results.   Underlying Medical Conditions: Age  Patient testing positive on 9/7/23   Test by:  at home covid test  Start of Symptoms: 9/5  Covid 19 Vaccination Status:  Moderna initial and two boosters  Are you pregnant, breastfeeding or trying to conceive? No    COVID-19 Symptom Review    New or worsening difficulty breathing? no  Cough? yes     Dry or Productive?  dry  Fever?  yes     Highest temp past 24 hours?  100  Chills?  yes  Headache:  yes  Sore throat: no  Chest pain: steady heaviness  Diarrhea: no  Body aches?  yes  Nasal congestion or drainage?  drainage     What treatments has patient tried?  Aspirin, bone broth  Does patient live in a nursing home, group home, or shelter? no  Does patient have a way to get food/medications during quarantine? yes    Appointment Scheduled:  No. Patient declined antiviral treatment    [facility]: [facility]    Confirmed with Pharmacy: Paxlovid & Molnupiravir in stock      Instructions below provided to patient. Patient verbalized understanding.     Instructions for Patients  Your COVID-19 test was positive. This means you have the virus. Please follow the \"How can I take care of myself\" and \"How do I self-isolate?\" guidelines in these instructions.    What treatments are available?  Over-the-counter medicines may help with your symptoms such as runny or stuffy nose, cough, chills, and fever. Talk to your care team about your options.     Some people are at high risk for severe illness (for example if you have a weak immune system, you're 65 or older, or you have certain medical problems). If your risk it high and your symptoms started in the last 5 to 7 days, we strongly recommend for you to get COVID treatment as soon as possible before you get really sick. Paxlovid, Molnupiravir and the monoclonal antibody treatments are proven safe and effective, make you feel better faster, and prevent hospitalization and death.  "      What are the symptoms of COVID-19?  Symptoms can include fever, cough, shortness of breath, chills, headache, muscle pain sore throat, fatigue, runny or stuffy nose, and loss of taste and smell. Other less common symptoms include nausea, vomiting, or diarrhea (watery stools).    Know when to call 911. Emergency warning signs include:  Trouble breathing or shortness of breath  Pain or pressure in the chest that doesn't go away  Feeling confused like you haven't felt before, or not being able to wake up  Bluish-colored lips or face    How can I take care of myself?  Get lots of rest. Drink extra fluids (unless a doctor has told you not to).  Take Tylenol (acetaminophen) for fever or pain. If you have liver or kidney problems, ask your family doctor if it's okay to take Tylenol   Adults:   650 mg (two 325 mg pills or tablets) every 4 to 6 hours, or...   1,000 mg (two 500 mg pills or tablets) every 8 hours as needed.  Note: Don't take more than 3,000 mg in one day. Acetaminophen is found in many medicines (both prescribed and over the counter). Read all labels to be sure you don't take too much.  For children, check the Tylenol bottle for the right dose. The dose is based on the child's age or weight.  Take over the counter medicines for your symptoms as needed. Talk to your pharmacist.  If you have other health problems (like cancer, heart failure, an organ transplant, or severe kidney disease): Call your specialty clinic if you don't feel better in the next 2 days.    These guidelines are for isolating and quarantining before returning to work, school or .   For employers, schools and day cares: This is an official notice for this person's medical guidelines for returning in-person.   For health care sites: The CDC gives different isolation and quarantine guidelines for healthcare sites, please check with these sites before arriving.     How do I self-isolate?  You isolate when you have symptoms of COVID  or a test shows you have COVID, even if you don't have symptoms.   If you DO have symptoms:  Stay home and away from others  For at least 5 days after your symptoms started, AND   You are fever free for 24 hours (with no medicine that reduces fever), AND  Your other symptoms are better.  Wear a mask for 10 full days any time you are around others.  If you DON'T have symptoms:  Stay at home and away from others for at least 5 days after your positive test.  Wear a mask for 10 full days any time you are around others.    How and when do I quarantine?  You quarantine when you may have been exposed to the virus and DON'T have symptoms.   Stay home and away from others.   You must quarantine for 5 days after your last contact with a person who has COVID.  Note: If you are fully vaccinated, you don't need to quarantine. You should still follow the steps below.   Wear a mask for 10 full days any time you're around others.  Get tested at least 5 days after you were exposed, even if you don't have symptoms.   If you start to have symptoms, isolate right away and get tested.    Where can I get more information?  [facility] COVID-19 Resource Hub: [email or web address]   CDC Quarantine & Isolation: https://www.cdc.gov/coronavirus/2019-ncov/your-health/quarantine-isolation.html   CDC - What to Do If You're Sick: https://www.cdc.gov/coronavirus/2019-ncov/if-you-are-sick/index.html  [facility] clinical trials (COVID-19 research studies): clinicalaffairs.[facility]/umn-clinical-trials  [place] Department of Health COVID-19 Public Hotline: 1-964.200.8907

## 2023-11-09 DIAGNOSIS — Z12.11 COLON CANCER SCREENING: ICD-10-CM

## 2023-11-18 ENCOUNTER — HEALTH MAINTENANCE LETTER (OUTPATIENT)
Age: 73
End: 2023-11-18

## 2023-11-23 ENCOUNTER — LAB (OUTPATIENT)
Dept: FAMILY MEDICINE | Facility: CLINIC | Age: 73
End: 2023-11-23

## 2023-11-23 DIAGNOSIS — Z12.11 COLON CANCER SCREENING: ICD-10-CM

## 2023-12-07 LAB — NONINV COLON CA DNA+OCC BLD SCRN STL QL: NEGATIVE

## 2024-05-06 NOTE — PATIENT INSTRUCTIONS
"Start lisinopril for blood pressure and kidney protection    We will call you with your lab results.   Order placed for mammogram  Next time you are at your pharmacy, update your tetanus, shingles   Order placed for CT chest to check on your lung nodule    Preventive Care Advice   This is general advice we often give to help people stay healthy. Your care team may have specific advice just for you. Please talk to your care team about your own preventive care needs.  Lifestyle  Exercise at least 150 minutes each week (30 minutes a day, 5 days a week).  Do muscle strengthening activities 2 days a week. These help control your weight and prevent disease.  No smoking.  Wear sunscreen to prevent skin cancer.  Have your home tested for radon every 2 to 5 years. Radon is a colorless, odorless gas that can harm your lungs. To learn more, go to www.health.state.[place] and search for \"Radon in Homes.\"  Keep guns unloaded and locked up in a safe place like a safe or gun vault, or, use a gun lock and hide the keys. Always lock away bullets separately. To learn more, visit [place].gov and search for \"safe gun storage.\"  Nutrition  Eat 5 or more servings of fruits and vegetables each day.  Try wheat bread, brown rice and whole grain pasta (instead of white bread, rice, and pasta).  Get enough calcium and vitamin D. Check the label on foods and aim for 100% of the RDA (recommended daily allowance).  Regular exams  Have a dental exam and cleaning every 6 months.  See your health care team every year to talk about:  Any changes in your health.  Any medicines your care team has prescribed.  Preventive care, family planning, and ways to prevent chronic diseases.  Shots (vaccines)   HPV shots (up to age 26), if you've never had them before.  Hepatitis B shots (up to age 59), if you've never had them before.  COVID-19 shot: Get this shot when it's due.  Flu shot: Get a flu shot every year.  Tetanus shot: Get a tetanus shot every 10 " years.  Pneumococcal, hepatitis A, and RSV shots: Ask your care team if you need these based on your risk.  Shingles shot (for age 50 and up).  General health tests  Diabetes screening:  Starting at age 35, Get screened for diabetes at least every 3 years.  If you are younger than age 35, ask your care team if you should be screened for diabetes.  Cholesterol test: At age 39, start having a cholesterol test every 5 years, or more often if advised.  Bone density scan (DEXA): At age 50, ask your care team if you should have this scan for osteoporosis (brittle bones).  Hepatitis C: Get tested at least once in your life.  Abdominal aortic aneurysm screening: Talk to your doctor about having this screening if you:  Have ever smoked; and  Are biologically male; and  Are between the ages of 65 and 75.  STIs (sexually transmitted infections)  Before age 24: Ask your care team if you should be screened for STIs.  After age 24: Get screened for STIs if you're at risk. You are at risk for STIs (including HIV) if:  You are sexually active with more than one person.  You don't use condoms every time.  You or a partner was diagnosed with a sexually transmitted infection.  If you are at risk for HIV, ask about PrEP medicine to prevent HIV.  Get tested for HIV at least once in your life, whether you are at risk for HIV or not.  Cancer screening tests  Cervical cancer screening: If you have a cervix, begin getting regular cervical cancer screening tests at age 21. Most people who have regular screenings with normal results can stop after age 65. Talk about this with your provider.  Breast cancer scan (mammogram): If you've ever had breasts, begin having regular mammograms starting at age 40. This is a scan to check for breast cancer.  Colon cancer screening: It is important to start screening for colon cancer at age 45.  Have a colonoscopy test every 10 years (or more often if you're at risk) Or, ask your provider about stool tests  like a FIT test every year or Cologuard test every 3 years.  To learn more about your testing options, visit: [email or web address]/506382.pdf.  For help making a decision, visit: po/oa18850.  Prostate cancer screening test: If you have a prostate and are age 55 to 69, ask your provider if you would benefit from a yearly prostate cancer screening test.  Lung cancer screening: If you are a current or former smoker age 50 to 80, ask your care team if ongoing lung cancer screenings are right for you.  For informational purposes only. Not to replace the advice of your health care provider. Copyright   2023 [facility]. All rights reserved. Clinically reviewed by the [facility]. IMRSV 019909 - REV 04/24.

## 2024-05-07 ENCOUNTER — OFFICE VISIT (OUTPATIENT)
Dept: FAMILY MEDICINE | Facility: OTHER | Age: 74
End: 2024-05-07
Attending: FAMILY MEDICINE
Payer: COMMERCIAL

## 2024-05-07 ENCOUNTER — LAB (OUTPATIENT)
Dept: LAB | Facility: OTHER | Age: 74
End: 2024-05-07
Attending: FAMILY MEDICINE
Payer: COMMERCIAL

## 2024-05-07 VITALS
HEIGHT: 67 IN | OXYGEN SATURATION: 95 % | TEMPERATURE: 98.3 F | SYSTOLIC BLOOD PRESSURE: 148 MMHG | BODY MASS INDEX: 24.11 KG/M2 | WEIGHT: 153.6 LBS | RESPIRATION RATE: 17 BRPM | HEART RATE: 60 BPM | DIASTOLIC BLOOD PRESSURE: 64 MMHG

## 2024-05-07 DIAGNOSIS — N18.31 STAGE 3A CHRONIC KIDNEY DISEASE (H): ICD-10-CM

## 2024-05-07 DIAGNOSIS — Z00.00 ENCOUNTER FOR MEDICARE ANNUAL WELLNESS EXAM: Primary | ICD-10-CM

## 2024-05-07 DIAGNOSIS — E78.2 MIXED HYPERLIPIDEMIA: ICD-10-CM

## 2024-05-07 DIAGNOSIS — G25.81 RESTLESS LEG SYNDROME: ICD-10-CM

## 2024-05-07 DIAGNOSIS — R91.1 RIGHT LOWER LOBE PULMONARY NODULE: ICD-10-CM

## 2024-05-07 DIAGNOSIS — I10 BENIGN ESSENTIAL HYPERTENSION: ICD-10-CM

## 2024-05-07 DIAGNOSIS — Z12.31 SCREENING MAMMOGRAM FOR BREAST CANCER: ICD-10-CM

## 2024-05-07 DIAGNOSIS — R79.9 ABNORMAL FINDING OF BLOOD CHEMISTRY, UNSPECIFIED: ICD-10-CM

## 2024-05-07 LAB
ALBUMIN SERPL BCG-MCNC: 4.1 G/DL (ref 3.5–5.2)
ALP SERPL-CCNC: 111 U/L (ref 40–150)
ALT SERPL W P-5'-P-CCNC: 19 U/L (ref 0–50)
ANION GAP SERPL CALCULATED.3IONS-SCNC: 8 MMOL/L (ref 7–15)
AST SERPL W P-5'-P-CCNC: 25 U/L (ref 0–45)
BILIRUB SERPL-MCNC: 0.6 MG/DL
BUN SERPL-MCNC: 21.2 MG/DL (ref 8–23)
CALCIUM SERPL-MCNC: 9.4 MG/DL (ref 8.8–10.2)
CHLORIDE SERPL-SCNC: 106 MMOL/L (ref 98–107)
CHOLEST SERPL-MCNC: 209 MG/DL
CREAT SERPL-MCNC: 1.07 MG/DL (ref 0.51–0.95)
CREAT UR-MCNC: 21.2 MG/DL
DEPRECATED HCO3 PLAS-SCNC: 26 MMOL/L (ref 22–29)
EGFRCR SERPLBLD CKD-EPI 2021: 55 ML/MIN/1.73M2
ERYTHROCYTE [DISTWIDTH] IN BLOOD BY AUTOMATED COUNT: 11.8 % (ref 10–15)
FASTING STATUS PATIENT QL REPORTED: NO
FASTING STATUS PATIENT QL REPORTED: NO
FERRITIN SERPL-MCNC: 106 NG/ML (ref 11–328)
GLUCOSE SERPL-MCNC: 111 MG/DL (ref 70–99)
HCT VFR BLD AUTO: 37.7 % (ref 35–47)
HDLC SERPL-MCNC: 52 MG/DL
HGB BLD-MCNC: 12.8 G/DL (ref 11.7–15.7)
IRON BINDING CAPACITY (ROCHE): 197 UG/DL (ref 240–430)
IRON SATN MFR SERPL: 48 % (ref 15–46)
IRON SERPL-MCNC: 95 UG/DL (ref 37–145)
LDLC SERPL CALC-MCNC: 136 MG/DL
MAGNESIUM SERPL-MCNC: 2.1 MG/DL (ref 1.7–2.3)
MCH RBC QN AUTO: 31.3 PG (ref 26.5–33)
MCHC RBC AUTO-ENTMCNC: 34 G/DL (ref 31.5–36.5)
MCV RBC AUTO: 92 FL (ref 78–100)
MICROALBUMIN UR-MCNC: <12 MG/L
MICROALBUMIN/CREAT UR: NORMAL MG/G{CREAT}
NONHDLC SERPL-MCNC: 157 MG/DL
PLATELET # BLD AUTO: 252 10E3/UL (ref 150–450)
POTASSIUM SERPL-SCNC: 4 MMOL/L (ref 3.4–5.3)
PROT SERPL-MCNC: 6.7 G/DL (ref 6.4–8.3)
RBC # BLD AUTO: 4.09 10E6/UL (ref 3.8–5.2)
SODIUM SERPL-SCNC: 140 MMOL/L (ref 135–145)
TRIGL SERPL-MCNC: 103 MG/DL
WBC # BLD AUTO: 5.9 10E3/UL (ref 4–11)

## 2024-05-07 PROCEDURE — 99214 OFFICE O/P EST MOD 30 MIN: CPT | Mod: 25 | Performed by: FAMILY MEDICINE

## 2024-05-07 PROCEDURE — 82728 ASSAY OF FERRITIN: CPT | Mod: ZL | Performed by: FAMILY MEDICINE

## 2024-05-07 PROCEDURE — 82607 VITAMIN B-12: CPT | Mod: ZL | Performed by: FAMILY MEDICINE

## 2024-05-07 PROCEDURE — G0463 HOSPITAL OUTPT CLINIC VISIT: HCPCS | Mod: 25

## 2024-05-07 PROCEDURE — G0439 PPPS, SUBSEQ VISIT: HCPCS | Performed by: FAMILY MEDICINE

## 2024-05-07 PROCEDURE — 85027 COMPLETE CBC AUTOMATED: CPT | Mod: ZL

## 2024-05-07 PROCEDURE — 82043 UR ALBUMIN QUANTITATIVE: CPT | Mod: ZL

## 2024-05-07 PROCEDURE — 36415 COLL VENOUS BLD VENIPUNCTURE: CPT | Mod: ZL

## 2024-05-07 PROCEDURE — 80061 LIPID PANEL: CPT | Mod: ZL

## 2024-05-07 PROCEDURE — 83550 IRON BINDING TEST: CPT | Mod: ZL | Performed by: FAMILY MEDICINE

## 2024-05-07 PROCEDURE — 83735 ASSAY OF MAGNESIUM: CPT | Mod: ZL | Performed by: FAMILY MEDICINE

## 2024-05-07 PROCEDURE — 80053 COMPREHEN METABOLIC PANEL: CPT | Mod: ZL

## 2024-05-07 PROCEDURE — G0463 HOSPITAL OUTPT CLINIC VISIT: HCPCS

## 2024-05-07 RX ORDER — GABAPENTIN 300 MG/1
300 CAPSULE ORAL AT BEDTIME
Qty: 90 CAPSULE | Refills: 1 | Status: SHIPPED | OUTPATIENT
Start: 2024-05-07 | End: 2024-08-19

## 2024-05-07 RX ORDER — LISINOPRIL 10 MG/1
10 TABLET ORAL DAILY
Qty: 90 TABLET | Refills: 1 | Status: SHIPPED | OUTPATIENT
Start: 2024-05-07 | End: 2024-05-09

## 2024-05-07 SDOH — HEALTH STABILITY: PHYSICAL HEALTH: ON AVERAGE, HOW MANY DAYS PER WEEK DO YOU ENGAGE IN MODERATE TO STRENUOUS EXERCISE (LIKE A BRISK WALK)?: 4 DAYS

## 2024-05-07 ASSESSMENT — SOCIAL DETERMINANTS OF HEALTH (SDOH): HOW OFTEN DO YOU GET TOGETHER WITH FRIENDS OR RELATIVES?: ONCE A WEEK

## 2024-05-07 ASSESSMENT — PAIN SCALES - GENERAL: PAINLEVEL: NO PAIN (0)

## 2024-05-07 NOTE — PROGRESS NOTES
Preventive Care Visit  [facility]  Kaylen Hawkins MD, Family Medicine  May 7, 2024      Assessment & Plan     Encounter for Medicare annual wellness exam  Discussed and updated preventive cares  Repeat wellness visit in one year    Right lower lobe pulmonary nodule seen on imaging 11/22/2019 - repeat imaging 4/23  - CT Chest w/o Contrast; Future    Mixed hyperlipidemia  Stable lipid panel  - Lipid Profile; Future  - Comprehensive metabolic panel; Future  - recommendation for trial of zetia    Stage 3a chronic kidney disease (H)  stable  - CBC with platelets; Future  - Albumin Random Urine Quantitative with Creat Ratio; Future    Restless leg syndrome / Abnormal finding of blood chemistry, unspecified  B12 pending, otherwise labs wnl. Will trial gabapentin   - Ferritin  - Vitamin B12  - Magnesium  - Iron and iron binding capacity  - start and titrate - gabapentin (NEURONTIN) 300 MG capsule; Take 1 capsule (300 mg) by mouth at bedtime    Benign essential hypertension  BP consistently above goal. Start ACE for HTN and renal protection  - start lisinopril (ZESTRIL) 10 MG tablet; Take 1 tablet (10 mg) by mouth daily  - recheck one month in clinic with labs    Screening mammogram for breast cancer  - MA SCREENING DIGITAL BILATERAL ([place]); Future          Counseling  Appropriate preventive services were discussed with this patient, including applicable screening as appropriate for fall prevention, nutrition, physical activity, Tobacco-use cessation, weight loss and cognition.  Checklist reviewing preventive services available has been given to the patient.  Reviewed patient's diet, addressing concerns and/or questions.   The patient was instructed to see the dentist every 6 months.   Information on urinary incontinence and treatment options given to patient.       See Patient Instructions    Return in about 1 year (around 5/7/2025) for Lab Work, Physical Exam.    Rayna Betancur is a 74 year old,  presenting for the following:  Physical and Lipids        5/7/2024    12:44 PM   Additional Questions   Roomed by Merle Briceño   Accompanied by self       Health Care Directive  Patient does not have a Health Care Directive or Living Will: Patient states has Advance Directive and will bring in a copy to clinic.    HPI    Concern - Restless Legs  Onset: Ongoing. Duration of months  Description: Legs hurt at night while sleeping  Intensity: moderate  Progression of Symptoms:  same  Accompanying Signs & Symptoms: Needs to walk during the do to her leg pain  Previous history of similar problem: na  Precipitating factors:        Worsened by: na  Alleviating factors:        Improved by: na  Therapies tried and outcome:  none   - taking Mg, Bcomplex,  - not taking iron   - gabapentin 300mg -  okay to Rx if labs are normal    # Elevated BP    BP Readings from Last 6 Encounters:   05/07/24 (!) 148/64   12/17/21 (!) 140/58   09/20/21 129/71   09/03/21 122/54   06/17/21 122/52   12/11/20 120/62     # HLD  - brazil nut, almonds, pine nuts, apricot     # shortness of breath  - going up stairs   - many years - chronic   - s/p cardiac work up including angiogram. negative        5/7/2024   General Health   How would you rate your overall physical health? Excellent   Feel stress (tense, anxious, or unable to sleep) To some extent   (!) STRESS CONCERN      5/7/2024   Nutrition   Diet: Regular (no restrictions)    Low salt         5/7/2024   Exercise   Days per week of moderate/strenous exercise 4 days         5/7/2024   Social Factors   Frequency of gathering with friends or relatives Once a week   Worry food won't last until get money to buy more No   Food not last or not have enough money for food? No   Do you have housing?  Yes   Are you worried about losing your housing? No   Lack of transportation? No   Unable to get utilities (heat,electricity)? No         5/7/2024   Fall Risk   Fallen 2 or more times in the past year? No    Trouble with walking or balance? No          5/7/2024   Activities of Daily Living- Home Safety   Needs help with the following daily activites None of the above   Safety concerns in the home None of the above         5/7/2024   Dental   Dentist two times every year? (!) NO         5/7/2024   Hearing Screening   Hearing concerns? None of the above         5/7/2024   Driving Risk Screening   Patient/family members have concerns about driving No         5/7/2024   General Alertness/Fatigue Screening   Have you been more tired than usual lately? No         5/7/2024   Urinary Incontinence Screening   Bothered by leaking urine in past 6 months Yes         5/7/2024   TB Screening   Were you born outside of the [place]? No         Today's PHQ-2 Score:       5/7/2024    12:28 PM   PHQ-2 ( 1999 [employer])   Q1: Little interest or pleasure in doing things 0   Q2: Feeling down, depressed or hopeless 0   PHQ-2 Score 0   Q1: Little interest or pleasure in doing things Not at all   Q2: Feeling down, depressed or hopeless Not at all   PHQ-2 Score 0           5/7/2024   Substance Use   Alcohol more than 3/day or more than 7/wk Not Applicable   Do you have a current opioid prescription? No   How severe/bad is pain from 1 to 10? 0/10 (No Pain)   Do you use any other substances recreationally? No     Social History     Tobacco Use    Smoking status: Never    Smokeless tobacco: Never   Substance Use Topics    Alcohol use: Not Currently     Comment: former    Drug use: Never         9/16/2021   LAST FHS-7 RESULTS   1st degree relative breast or ovarian cancer No   Any relative bilateral breast cancer No   Any male have breast cancer No   Any ONE woman have BOTH breast AND ovarian cancer No   Any woman with breast cancer before 50yrs No   2 or more relatives with breast AND/OR ovarian cancer No   2 or more relatives with breast AND/OR bowel cancer No       Mammogram Screening - Mammogram every 1-2 years updated in Health Maintenance based on  mutual decision making    # Wellness:  - Mammogram: due - order placed  - Immunizations: [facility] pharmacy  - Lipids: updating today   - Dexa scan (9/21/2021): lowest T score of -2.0. major 9.7%, hip 1.6%  - Colon cancer screening: cologuard (11/29/2023) negative  - Lung cancer screening: never smoker  - AAA screening:     ASCVD Risk   The 10-year ASCVD risk score (Umberto BLACK, et al., 2019) is: 15.5%    Values used to calculate the score:      Age: 74 years      Sex: Female      Is Non- : No      Diabetic: No      Tobacco smoker: No      Systolic Blood Pressure: 148 mmHg      Is BP treated: No      HDL Cholesterol: 52 mg/dL      Total Cholesterol: 209 mg/dL    # social  - boyfriend passed this March     Reviewed and updated as needed this visit by Provider   Tobacco  Allergies  Meds  Problems  Med Hx  Surg Hx  Fam Hx            Current providers sharing in care for this patient include:  Patient Care Team:  Kaylen Hawkins MD as PCP - General (Family Practice)  Kaylen Hawkins MD as Assigned PCP    The following health maintenance items are reviewed in Epic and correct as of today:  Health Maintenance   Topic Date Due    HEPATITIS C SCREENING  Never done    RSV VACCINE (Pregnancy & 60+) (1 - 1-dose 60+ series) Never done    ZOSTER IMMUNIZATION (2 of 3) 11/12/2013    DTAP/TDAP/TD IMMUNIZATION (3 - Td or Tdap) 11/23/2022    COVID-19 Vaccine (5 - 2023-24 season) 09/01/2023    MAMMO SCREENING  09/16/2023    MEDICARE ANNUAL WELLNESS VISIT  05/07/2025    BMP  05/07/2025    LIPID  05/07/2025    MICROALBUMIN  05/07/2025    FALL RISK ASSESSMENT  05/07/2025    HEMOGLOBIN  05/07/2025    DEXA  09/21/2026    COLORECTAL CANCER SCREENING  11/29/2026    GLUCOSE  05/07/2027    ADVANCE CARE PLANNING  05/07/2029    PHQ-2 (once per calendar year)  Completed    INFLUENZA VACCINE  Completed    Pneumococcal Vaccine: 65+ Years  Completed    URINALYSIS  Completed    IPV IMMUNIZATION  Aged Out  "   HPV IMMUNIZATION  Aged Out    MENINGITIS IMMUNIZATION  Aged Out    RSV MONOCLONAL ANTIBODY  Aged Out         Review of Systems  Constitutional, HEENT, cardiovascular, pulmonary, gi and gu systems are negative, except as otherwise noted.     Objective    Exam  BP (!) 148/64   Pulse 60   Temp 98.3  F (36.8  C) (Tympanic)   Resp 17   Ht 1.702 m (5' 7\")   Wt 69.7 kg (153 lb 9.6 oz)   SpO2 95%   BMI 24.06 kg/m     Estimated body mass index is 24.06 kg/m  as calculated from the following:    Height as of this encounter: 1.702 m (5' 7\").    Weight as of this encounter: 69.7 kg (153 lb 9.6 oz).    Physical Exam  Constitutional:       General: She is not in acute distress.     Appearance: She is well-developed.   HENT:      Head: Normocephalic and atraumatic.      Right Ear: Hearing and tympanic membrane normal.      Left Ear: Hearing and tympanic membrane normal.      Mouth/Throat:      Mouth: Mucous membranes are moist.      Pharynx: No oropharyngeal exudate.   Eyes:      Extraocular Movements: Extraocular movements intact.      Conjunctiva/sclera: Conjunctivae normal.   Neck:      Thyroid: No thyromegaly.   Cardiovascular:      Rate and Rhythm: Normal rate and regular rhythm.      Pulses: Normal pulses.      Heart sounds: Normal heart sounds. No murmur heard.  Pulmonary:      Effort: Pulmonary effort is normal. No respiratory distress.      Breath sounds: Normal breath sounds. No wheezing or rales.   Abdominal:      General: Bowel sounds are normal. There is no distension.      Palpations: Abdomen is soft.      Tenderness: There is no abdominal tenderness. There is no guarding.   Musculoskeletal:         General: Normal range of motion.      Cervical back: Normal range of motion and neck supple.      Right lower leg: No edema.      Left lower leg: No edema.   Lymphadenopathy:      Cervical: No cervical adenopathy.   Skin:     General: Skin is dry.   Neurological:      Mental Status: She is alert.   Psychiatric:  "        Mood and Affect: Mood normal.         Results for orders placed or performed in visit on 05/07/24   Ferritin     Status: Normal   Result Value Ref Range    Ferritin 106 11 - 328 ng/mL   Magnesium     Status: Normal   Result Value Ref Range    Magnesium 2.1 1.7 - 2.3 mg/dL   Iron and iron binding capacity     Status: Abnormal   Result Value Ref Range    Iron 95 37 - 145 ug/dL    Iron Binding Capacity 197 (L) 240 - 430 ug/dL    Iron Sat Index 48 (H) 15 - 46 %   Results for orders placed or performed in visit on 05/07/24   Lipid Profile     Status: Abnormal   Result Value Ref Range    Cholesterol 209 (H) <200 mg/dL    Triglycerides 103 <150 mg/dL    Direct Measure HDL 52 >=50 mg/dL    LDL Cholesterol Calculated 136 (H) <=100 mg/dL    Non HDL Cholesterol 157 (H) <130 mg/dL    Patient Fasting > 8hrs? No     Narrative    Cholesterol  Desirable:  <200 mg/dL    Triglycerides  Normal:  Less than 150 mg/dL  Borderline High:  150-199 mg/dL  High:  200-499 mg/dL  Very High:  Greater than or equal to 500 mg/dL    Direct Measure HDL  Female:  Greater than or equal to 50 mg/dL   Male:  Greater than or equal to 40 mg/dL    LDL Cholesterol  Desirable:  <100mg/dL  Above Desirable:  100-129 mg/dL   Borderline High:  130-159 mg/dL   High:  160-189 mg/dL   Very High:  >= 190 mg/dL    Non HDL Cholesterol  Desirable:  130 mg/dL  Above Desirable:  130-159 mg/dL  Borderline High:  160-189 mg/dL  High:  190-219 mg/dL  Very High:  Greater than or equal to 220 mg/dL   Comprehensive metabolic panel     Status: Abnormal   Result Value Ref Range    Sodium 140 135 - 145 mmol/L    Potassium 4.0 3.4 - 5.3 mmol/L    Carbon Dioxide (CO2) 26 22 - 29 mmol/L    Anion Gap 8 7 - 15 mmol/L    Urea Nitrogen 21.2 8.0 - 23.0 mg/dL    Creatinine 1.07 (H) 0.51 - 0.95 mg/dL    GFR Estimate 55 (L) >60 mL/min/1.73m2    Calcium 9.4 8.8 - 10.2 mg/dL    Chloride 106 98 - 107 mmol/L    Glucose 111 (H) 70 - 99 mg/dL    Alkaline Phosphatase 111 40 - 150 U/L    AST  25 0 - 45 U/L    ALT 19 0 - 50 U/L    Protein Total 6.7 6.4 - 8.3 g/dL    Albumin 4.1 3.5 - 5.2 g/dL    Bilirubin Total 0.6 <=1.2 mg/dL    Patient Fasting > 8hrs? No    CBC with platelets     Status: Normal   Result Value Ref Range    WBC Count 5.9 4.0 - 11.0 10e3/uL    RBC Count 4.09 3.80 - 5.20 10e6/uL    Hemoglobin 12.8 11.7 - 15.7 g/dL    Hematocrit 37.7 35.0 - 47.0 %    MCV 92 78 - 100 fL    MCH 31.3 26.5 - 33.0 pg    MCHC 34.0 31.5 - 36.5 g/dL    RDW 11.8 10.0 - 15.0 %    Platelet Count 252 150 - 450 10e3/uL   Albumin Random Urine Quantitative with Creat Ratio     Status: None   Result Value Ref Range    Creatinine Urine mg/dL 21.2 mg/dL    Albumin Urine mg/L <12.0 mg/L    Albumin Urine mg/g Cr               5/7/2024   Mini Cog   Clock Draw Score 2 Normal   3 Item Recall 2 objects recalled   Mini Cog Total Score 4              Signed Electronically by: Kaylen Hawkins MD

## 2024-05-08 LAB — VIT B12 SERPL-MCNC: 815 PG/ML (ref 232–1245)

## 2024-05-09 RX ORDER — LISINOPRIL 10 MG/1
5 TABLET ORAL DAILY
Status: SHIPPED
Start: 2024-05-09 | End: 2024-06-12

## 2024-06-11 PROBLEM — G25.81 RESTLESS LEG SYNDROME: Status: ACTIVE | Noted: 2024-06-11

## 2024-06-11 PROBLEM — I10 BENIGN ESSENTIAL HYPERTENSION: Status: ACTIVE | Noted: 2024-06-11

## 2024-06-11 NOTE — PROGRESS NOTES
Assessment & Plan     Mixed hyperlipidemia  Declines statins    Restless leg syndrome  Gabapentin resulted in dizziness  - discontinue gabapentin     Benign essential hypertension  BP at goal  - Basic metabolic panel; Future  - discontinue lisinopril     Stage 3a chronic kidney disease (H)  stable      See Patient Instructions    Return if symptoms worsen or fail to improve.  Next visit 5/9/2025    Rayna Betancur is a 74 year old, presenting for the following health issues:  Hypertension        6/12/2024     8:34 AM   Additional Questions   Roomed by Jeanette Cody   Accompanied by self         6/12/2024     8:34 AM   Patient Reported Additional Medications   Patient reports taking the following new medications none     History of Present Illness       Hyperlipidemia:  She presents for follow up of hyperlipidemia.   She is not taking medication to lower cholesterol. She is not having myalgia or other side effects to statin medications.    Hypertension: She presents for follow up of hypertension.  She does not check blood pressure  regularly outside of the clinic. Outpatient blood pressures have not been over 140/90. She follows a low salt diet.     She eats 2-3 servings of fruits and vegetables daily.She consumes 0 sweetened beverage(s) daily.She exercises with enough effort to increase her heart rate 20 to 29 minutes per day.  She exercises with enough effort to increase her heart rate 5 days per week.   She is taking medications regularly.       Hyperlipidemia Follow-Up    Are you regularly taking any medication or supplement to lower your cholesterol?   No- pt eats nuts and fruit to lower cholesterol       Recent Labs   Lab Test 05/07/24  1237 06/17/22  0750   CHOL 209* 210*   HDL 52 49*   * 141*   TRIG 103 99     The 10-year ASCVD risk score (Umberto BLACK, et al., 2019) is: 17.6%    Values used to calculate the score:      Age: 74 years      Sex: Female      Is Non- : No       "Diabetic: No      Tobacco smoker: No      Systolic Blood Pressure: 136 mmHg      Is BP treated: Yes      HDL Cholesterol: 52 mg/dL      Total Cholesterol: 209 mg/dL      - started lisinopril 10mg - stopped taking d/t dizziness / lightheadness    # Restless legs  - trial of gabapentin not taking, made her dizzy       Review of Systems  Constitutional, HEENT, cardiovascular, pulmonary, gi and gu systems are negative, except as otherwise noted.      Objective    /72 (BP Location: Left arm, Patient Position: Sitting, Cuff Size: Adult Regular)   Pulse 57   Temp 98.5  F (36.9  C) (Tympanic)   Resp 15   Ht 1.702 m (5' 7\")   Wt 66.5 kg (146 lb 11.2 oz)   SpO2 98%   BMI 22.98 kg/m    Body mass index is 22.98 kg/m .  Physical Exam  Constitutional:       General: She is not in acute distress.     Appearance: She is not ill-appearing.   Cardiovascular:      Rate and Rhythm: Normal rate and regular rhythm.      Heart sounds: No murmur heard.  Pulmonary:      Effort: Pulmonary effort is normal. No respiratory distress.      Breath sounds: No wheezing or rales.   Neurological:      Mental Status: She is alert.   Psychiatric:         Mood and Affect: Mood normal.            Results for orders placed or performed in visit on 06/12/24 (from the past 24 hour(s))   Basic metabolic panel   Result Value Ref Range    Sodium 140 135 - 145 mmol/L    Potassium 4.1 3.4 - 5.3 mmol/L    Chloride 106 98 - 107 mmol/L    Carbon Dioxide (CO2) 27 22 - 29 mmol/L    Anion Gap 7 7 - 15 mmol/L    Urea Nitrogen 16.6 8.0 - 23.0 mg/dL    Creatinine 1.06 (H) 0.51 - 0.95 mg/dL    GFR Estimate 56 (L) >60 mL/min/1.73m2    Calcium 9.7 8.8 - 10.2 mg/dL    Glucose 95 70 - 99 mg/dL           Signed Electronically by: Kaylen Hawkins MD    "

## 2024-06-12 ENCOUNTER — HOSPITAL ENCOUNTER (OUTPATIENT)
Dept: CT IMAGING | Facility: HOSPITAL | Age: 74
Discharge: HOME | End: 2024-06-12
Attending: FAMILY MEDICINE
Payer: COMMERCIAL

## 2024-06-12 ENCOUNTER — OFFICE VISIT (OUTPATIENT)
Dept: FAMILY MEDICINE | Facility: OTHER | Age: 74
End: 2024-06-12
Attending: FAMILY MEDICINE
Payer: COMMERCIAL

## 2024-06-12 ENCOUNTER — LAB (OUTPATIENT)
Dept: LAB | Facility: OTHER | Age: 74
End: 2024-06-12
Attending: FAMILY MEDICINE
Payer: COMMERCIAL

## 2024-06-12 VITALS
SYSTOLIC BLOOD PRESSURE: 136 MMHG | BODY MASS INDEX: 23.02 KG/M2 | HEART RATE: 57 BPM | WEIGHT: 146.7 LBS | DIASTOLIC BLOOD PRESSURE: 72 MMHG | RESPIRATION RATE: 15 BRPM | OXYGEN SATURATION: 98 % | TEMPERATURE: 98.5 F | HEIGHT: 67 IN

## 2024-06-12 DIAGNOSIS — E78.2 MIXED HYPERLIPIDEMIA: Primary | ICD-10-CM

## 2024-06-12 DIAGNOSIS — G25.81 RESTLESS LEG SYNDROME: ICD-10-CM

## 2024-06-12 DIAGNOSIS — N18.31 STAGE 3A CHRONIC KIDNEY DISEASE (H): ICD-10-CM

## 2024-06-12 DIAGNOSIS — I10 BENIGN ESSENTIAL HYPERTENSION: ICD-10-CM

## 2024-06-12 DIAGNOSIS — R91.1 RIGHT LOWER LOBE PULMONARY NODULE: ICD-10-CM

## 2024-06-12 LAB
ANION GAP SERPL CALCULATED.3IONS-SCNC: 7 MMOL/L (ref 7–15)
BUN SERPL-MCNC: 16.6 MG/DL (ref 8–23)
CALCIUM SERPL-MCNC: 9.7 MG/DL (ref 8.8–10.2)
CHLORIDE SERPL-SCNC: 106 MMOL/L (ref 98–107)
CREAT SERPL-MCNC: 1.06 MG/DL (ref 0.51–0.95)
DEPRECATED HCO3 PLAS-SCNC: 27 MMOL/L (ref 22–29)
EGFRCR SERPLBLD CKD-EPI 2021: 56 ML/MIN/1.73M2
GLUCOSE SERPL-MCNC: 95 MG/DL (ref 70–99)
POTASSIUM SERPL-SCNC: 4.1 MMOL/L (ref 3.4–5.3)
SODIUM SERPL-SCNC: 140 MMOL/L (ref 135–145)

## 2024-06-12 PROCEDURE — 82565 ASSAY OF CREATININE: CPT | Mod: ZL

## 2024-06-12 PROCEDURE — 99214 OFFICE O/P EST MOD 30 MIN: CPT | Performed by: FAMILY MEDICINE

## 2024-06-12 PROCEDURE — 36415 COLL VENOUS BLD VENIPUNCTURE: CPT | Mod: ZL

## 2024-06-12 PROCEDURE — 71250 CT THORAX DX C-: CPT

## 2024-06-12 PROCEDURE — 82374 ASSAY BLOOD CARBON DIOXIDE: CPT | Mod: ZL

## 2024-06-12 PROCEDURE — G0463 HOSPITAL OUTPT CLINIC VISIT: HCPCS

## 2024-06-12 ASSESSMENT — PAIN SCALES - GENERAL: PAINLEVEL: NO PAIN (0)

## 2024-06-12 NOTE — PATIENT INSTRUCTIONS
Office Visit on 05/07/2024   Component Date Value Ref Range Status    Ferritin 05/07/2024 106  11 - 328 ng/mL Final    Vitamin B12 05/07/2024 815  232 - 1,245 pg/mL Final    Magnesium 05/07/2024 2.1  1.7 - 2.3 mg/dL Final    Iron 05/07/2024 95  37 - 145 ug/dL Final    Iron Binding Capacity 05/07/2024 197 (L)  240 - 430 ug/dL Final    Iron Sat Index 05/07/2024 48 (H)  15 - 46 % Final     Recent Labs   Lab Test 05/07/24  1237 06/17/22  0750   CHOL 209* 210*   HDL 52 49*   * 141*   TRIG 103 99     CBC RESULTS:   Recent Labs   Lab Test 05/07/24  1237   WBC 5.9   RBC 4.09   HGB 12.8   HCT 37.7   MCV 92   MCH 31.3   MCHC 34.0   RDW 11.8

## 2024-06-18 ENCOUNTER — TELEPHONE (OUTPATIENT)
Dept: FAMILY MEDICINE | Facility: OTHER | Age: 74
End: 2024-06-18

## 2024-06-18 NOTE — TELEPHONE ENCOUNTER
----- Message from Lola Sargent sent at 6/18/2024  2:32 PM CDT -----  Regarding: Pt comment  Hello,    Patient is looking for results from their CT completed 6/12/24.    Thank you,    DI

## 2024-08-08 ENCOUNTER — TELEPHONE (OUTPATIENT)
Dept: MAMMOGRAPHY | Facility: HOSPITAL | Age: 74
End: 2024-08-08

## 2024-08-08 ENCOUNTER — ANCILLARY PROCEDURE (OUTPATIENT)
Dept: MAMMOGRAPHY | Facility: OTHER | Age: 74
End: 2024-08-08
Attending: FAMILY MEDICINE
Payer: COMMERCIAL

## 2024-08-08 DIAGNOSIS — Z12.31 SCREENING MAMMOGRAM FOR BREAST CANCER: ICD-10-CM

## 2024-08-08 PROCEDURE — 77063 BREAST TOMOSYNTHESIS BI: CPT | Mod: TC

## 2024-08-19 ENCOUNTER — LAB (OUTPATIENT)
Dept: LAB | Facility: OTHER | Age: 74
End: 2024-08-19
Payer: COMMERCIAL

## 2024-08-19 ENCOUNTER — OFFICE VISIT (OUTPATIENT)
Dept: PHARMACY | Facility: PHYSICIAN GROUP | Age: 74
End: 2024-08-19

## 2024-08-19 VITALS — SYSTOLIC BLOOD PRESSURE: 138 MMHG | BODY MASS INDEX: 23.7 KG/M2 | WEIGHT: 151.3 LBS | DIASTOLIC BLOOD PRESSURE: 62 MMHG

## 2024-08-19 DIAGNOSIS — E55.9 VITAMIN D DEFICIENCY: ICD-10-CM

## 2024-08-19 DIAGNOSIS — R52 PAIN: Primary | ICD-10-CM

## 2024-08-19 DIAGNOSIS — Z78.9 TAKES DIETARY SUPPLEMENTS: ICD-10-CM

## 2024-08-19 LAB — VIT D+METAB SERPL-MCNC: 79 NG/ML (ref 20–50)

## 2024-08-19 PROCEDURE — 82306 VITAMIN D 25 HYDROXY: CPT | Mod: ZL

## 2024-08-19 PROCEDURE — 36415 COLL VENOUS BLD VENIPUNCTURE: CPT | Mod: ZL

## 2024-08-19 PROCEDURE — 99207 PR NO CHARGE LOS: CPT

## 2024-08-19 RX ORDER — GINGER ROOT 550 MG
550 CAPSULE ORAL DAILY
COMMUNITY

## 2024-08-19 RX ORDER — LYSINE HCL 500 MG
500 TABLET ORAL DAILY
COMMUNITY

## 2024-08-19 NOTE — PATIENT INSTRUCTIONS
"Recommendations from today's MTM visit:                                                    MTM (medication therapy management) is a service provided by a clinical pharmacist designed to help you get the most of out of your medicines.   Today we reviewed what your medicines are for, how to know if they are working, that your medicines are safe and how to make your medicine regimen as easy as possible.      We will check your vitamin D level to make sure you are not taking too much vitamin D3. Stanton Garcia PharmD will call you with the results.     Follow-up: 1 year, sooner if needed.     It was great speaking with you today.  I value your experience and would be very thankful for your time in providing feedback in our clinic survey. In the next few days, you may receive an email or text message from [employer] with a link to a survey related to your  clinical pharmacist.\"     To schedule another MTM appointment, please call the clinic directly or you may call the MTM scheduling line at [phone] or toll-free at [phone].     My Clinical Pharmacist's contact information:                                                      Please feel free to contact me with any questions or concerns you have.      Stanton Garcia PharmD  Medication Therapy Management Pharmacist  [facility]  Office phone: [phone]   "

## 2024-08-19 NOTE — PROGRESS NOTES
Medication Therapy Management (MTM) Encounter    ASSESSMENT:                            Medication Adherence/Access:   No issues identified.    Pain:   Stable.    Supplements/Vitamin D Deficiency:  Patient would benefit from checking a vitamin D level to ensure she is not taking too much vitamin D3.    PLAN:                            We will check your vitamin D level to make sure you are not taking too much vitamin D3. Stanton Garcia, PharmD will call you with the results.     Follow-up: 1 year, sooner if needed.     SUBJECTIVE/OBJECTIVE:                          Gypsy Blackburn is a 74 year old female seen for an initial visit. She was referred to me from herself.      Reason for visit: Initial visit - comprehensive medication review.    Allergies/ADRs: Reviewed in chart  Past Medical History: Reviewed in chart  Tobacco: She reports that she has never smoked. She has never used smokeless tobacco.  Alcohol: none.  Caffeine: coffee - 2 cups in the morning.     Medication Adherence/Access: no issues reported  Patient uses pill box(es).  Patient takes medications 1 time(s) per day.   Per patient, misses medication 0 times per week.   Medication barriers: none.   The patient fills medications at [place]: NO, fills medications at [facility] in [place], [place].    Pain:    - Diclofenac 1% gel 2 grams topically four times daily as needed   Patient reports no medication side effects.  Pain type/location: hands/fingers  Pain is described as Throbbing.    Supplements   Supplements/Vitamin D Deficiency:  - Vitamin B complex with biotin and folic acid daily  - Calcium-magnesium-zinc-vitamin D3 daily  - Vitamin D3 5000 units daily  - Elderberry 50 mg daily as needed (during the months of September to April)  - Ginger root 550 mg daily  - L-Lysine 500 mg daily  - Hair, Skin, and Nails vitamin - 2 gummies daily  - Omega-3 fatty acids 1 capsule daily  - Turmeric complex 1 capsule daily  - Potassium 99 mg daily  - Chaga 1  dropper orally daily  No reported issues at this time.      Today's Vitals: /62 (BP Location: Left arm, Patient Position: Sitting, Cuff Size: Adult Regular)   Wt 151 lb 4.8 oz (68.6 kg)   BMI 23.70 kg/m    ----------------  I spent 30 minutes with this patient today. All changes were made via collaborative practice agreement with Kaylen Hawkins MD. A copy of the visit note was provided to the patient's provider(s).    A summary of these recommendations was given to the patient.    Stanton Garcia, PharmD  Medication Therapy Management Pharmacist  [facility]  Office phone: [phone]     Medication Therapy Recommendations  Vitamin D deficiency    Current Medication: cholecalciferol (VITAMIN D3) 125 mcg (5000 units) capsule   Rationale: Medication requires monitoring - Needs additional monitoring - Safety   Recommendation: Order Lab   Status: Accepted per CPA

## 2024-08-20 DIAGNOSIS — E67.3 HIGH VITAMIN D LEVEL: Primary | ICD-10-CM

## 2024-09-04 ENCOUNTER — TRANSFERRED RECORDS (OUTPATIENT)
Dept: HEALTH INFORMATION MANAGEMENT | Facility: CLINIC | Age: 74
End: 2024-09-04

## 2024-09-11 ENCOUNTER — OFFICE VISIT (OUTPATIENT)
Dept: PODIATRY | Facility: OTHER | Age: 74
End: 2024-09-11
Attending: PODIATRIST
Payer: COMMERCIAL

## 2024-09-11 VITALS
SYSTOLIC BLOOD PRESSURE: 153 MMHG | DIASTOLIC BLOOD PRESSURE: 74 MMHG | OXYGEN SATURATION: 95 % | TEMPERATURE: 98.3 F | HEART RATE: 70 BPM

## 2024-09-11 DIAGNOSIS — M62.461 GASTROCNEMIUS EQUINUS OF RIGHT LOWER EXTREMITY: ICD-10-CM

## 2024-09-11 DIAGNOSIS — N18.31 STAGE 3A CHRONIC KIDNEY DISEASE (H): ICD-10-CM

## 2024-09-11 DIAGNOSIS — M62.462 GASTROCNEMIUS EQUINUS OF LEFT LOWER EXTREMITY: ICD-10-CM

## 2024-09-11 DIAGNOSIS — L60.3 ONYCHODYSTROPHY: ICD-10-CM

## 2024-09-11 DIAGNOSIS — M77.52 BURSITIS OF LEFT FOOT: Primary | ICD-10-CM

## 2024-09-11 PROCEDURE — 99203 OFFICE O/P NEW LOW 30 MIN: CPT | Mod: 25 | Performed by: PODIATRIST

## 2024-09-11 PROCEDURE — 11721 DEBRIDE NAIL 6 OR MORE: CPT | Performed by: PODIATRIST

## 2024-09-11 PROCEDURE — G0463 HOSPITAL OUTPT CLINIC VISIT: HCPCS | Mod: 25

## 2024-09-11 PROCEDURE — G0463 HOSPITAL OUTPT CLINIC VISIT: HCPCS

## 2024-09-11 ASSESSMENT — PAIN SCALES - GENERAL: PAINLEVEL: MODERATE PAIN (5)

## 2024-09-11 NOTE — PROGRESS NOTES
Chief complaint: Patient presents with:  Musculoskeletal Problem: Foot pain, neuropathy      History of Present Illness: This 74 year old female is seen at the request of No ref. provider found for evaluation and suggestions of management of bilateral hallux pain. The big toes and the fifth toes have tingling and numbness. This has been ongoing for years and it flares at random times.    She also has heel pain (LEFT more than RIGHT). She was in clogs and she walked 18,000 steps around 09/03/2024 and her LEFT heel pain developed and has been very painful. She rolls her foot over a bumpy ball. Walking causes the most pain.    She had her ingrown toenails treated a year ago by another provider.  The nail is thick and difficult to trim so she would like it trimmed today.    No further pedal complaints today.        BP (!) 153/74 (BP Location: Left arm, Patient Position: Sitting, Cuff Size: Adult Regular)   Pulse 70   Temp 98.3  F (36.8  C) (Tympanic)   SpO2 95%     Patient Active Problem List   Diagnosis    HCD (health care directive)    Family history of heart disease    Lumbago    Osteoarthrosis involving lower leg    Plantar fasciitis    Abnormal stress test on 11/27/2019    Other chest pain    Mixed hyperlipidemia    Elevated coronary artery calcium score on 11/22/2019    Right lower lobe pulmonary nodule - stable for 4 years. no further imaging required    Left bundle branch block (LBBB)    Coronary artery disease involving native coronary artery of native heart without angina pectoris    H/O cardiac catheterization at [facility] on 8/24/2021.    Chronic kidney disease, stage 3 (H)    Benign essential hypertension    Restless leg syndrome       Past Surgical History:   Procedure Laterality Date    APPENDECTOMY  1964    ARTHROSCOPY KNEE Left 1989    HYSTERECTOMY  1974    HYSTERECTOMY TOTAL ABDOMINAL  2001    TONSILLECTOMY  1954       Current Outpatient Medications   Medication Sig Dispense Refill    B  Complex-Biotin-FA (SUPER B-COMPLEX) TABS Take 1 tablet by mouth daily      cholecalciferol (VITAMIN D3) 125 mcg (5000 units) capsule Take 1 capsule by mouth daily      diclofenac (VOLTAREN) 1 % topical gel Apply 2 g topically 4 times daily as needed for moderate pain 350 g 1    ELDERBERRY PO Take 50 mg by mouth daily as needed Seasonally (September to April)      Ginger, Zingiber officinalis, (GINGER ROOT) 550 MG CAPS capsule Take 550 mg by mouth daily      l-lysine HCl 500 MG TABS tablet Take 500 mg by mouth daily      Multiple Minerals-Vitamins (CALCIUM-MAGNESIUM-ZINC-D3 PO) Take 1 tablet by mouth daily      Multiple Vitamins-Minerals (HAIR SKIN & NAILS PO) Take 2 chew tab by mouth daily      Omega-3 Fatty Acids (FISH OIL PO) Take 1 capsule by mouth daily 500mg fish oil/krill oil blend, 339 mg omega-3 fatty acids, 314 mg EPA and DHA      potassium 99 MG TABS Take 1 tablet by mouth daily      Turmeric (QC TUMERIC COMPLEX PO) Take 1 capsule by mouth daily Turmeric 750 mg, ginger root powder 50 mg, black pepper root extract 5 mg      UNABLE TO FIND 2 drops MEDICATION NAME: Chaga - 1 dropper daily.       No current facility-administered medications for this visit.          Allergies   Allergen Reactions    Bee Venom      unknown reaction    Iodine      unknown reaction    Penicillins      Childhood    Etodolac Rash       Family History   Problem Relation Age of Onset    Myocardial Infarction Mother     Alcoholism Father         Shot deer hunting    Mental Illness Brother     Myocardial Infarction Brother     Myocardial Infarction Brother     Myocardial Infarction Brother     Myocardial Infarction Brother        Social History     Socioeconomic History    Marital status:      Spouse name: None    Number of children: 3    Years of education: None    Highest education level: None   Tobacco Use    Smoking status: Never    Smokeless tobacco: Never   Substance and Sexual Activity    Alcohol use: Not Currently      Comment: former    Drug use: Never     Social Determinants of Health     Financial Resource Strain: Low Risk  (5/7/2024)    Financial Resource Strain     Within the past 12 months, have you or your family members you live with been unable to get utilities (heat, electricity) when it was really needed?: No   Food Insecurity: Low Risk  (5/7/2024)    Food Insecurity     Within the past 12 months, did you worry that your food would run out before you got money to buy more?: No     Within the past 12 months, did the food you bought just not last and you didn t have money to get more?: No   Transportation Needs: Low Risk  (5/7/2024)    Transportation Needs     Within the past 12 months, has lack of transportation kept you from medical appointments, getting your medicines, non-medical meetings or appointments, work, or from getting things that you need?: No   Physical Activity: Unknown (5/7/2024)    Exercise Vital Sign     Days of Exercise per Week: 4 days   Stress: Stress Concern Present (5/7/2024)    [employer] - Occupational Stress Questionnaire     Feeling of Stress : To some extent   Social Connections: Unknown (5/7/2024)    Social Connection and Isolation Panel [NHANES]     Frequency of Social Gatherings with Friends and Family: Once a week   Interpersonal Safety: Low Risk  (9/11/2024)    Interpersonal Safety     Do you feel physically and emotionally safe where you currently live?: Yes     Within the past 12 months, have you been hit, slapped, kicked or otherwise physically hurt by someone?: No     Within the past 12 months, have you been humiliated or emotionally abused in other ways by your partner or ex-partner?: No   Housing Stability: Low Risk  (5/7/2024)    Housing Stability     Do you have housing? : Yes     Are you worried about losing your housing?: No       ROS: 10 point ROS neg other than the symptoms noted above in the HPI.  EXAM  Constitutional: healthy, alert, and no  distress    Psychiatric: mentation appears normal and affect normal/bright    VASCULAR:  -Dorsalis pedis pulse +2/4 b/l  -Posterior tibial pulse +2/4 b/l  -Capillary refill time < 3 seconds to b/l hallux  -Hair growth Present to b/l anterior legs and ankles  NEURO:  -Light touch sensation intact to b/l plantar forefoot  DERM:  -Skin temperature, texture and turgor WNL b/l  -Toenails elongated, thickened, dystrophic and discolored x 10  MSK:  -Pain on palpation to the plantar central heel  -Muscle strength of ankles +5/5 for dorsiflexion, plantarflexion, ABDUction and ADDuction b/l    -Ankle joint passive ROM within normal limits except for dorsiflexion:    Dorsiflexion, RIGHT Straight knee 0 degrees    Dorsiflexion, LEFT Straight knee 0 degrees    ============================================================    ASSESSMENT:  (M77.52) Bursitis of left foot  (primary encounter diagnosis)    (M62.461) Gastrocnemius equinus of right lower extremity    (M62.462) Gastrocnemius equinus of left lower extremity    (L60.3) Onychodystrophy    (N18.31) Stage 3a chronic kidney disease (H)      PLAN:  -Patient evaluated and examined. Treatment options discussed with no educational barriers noted.    Bursitis heel pain:  -Discussed plantar fascia pain including potential etiologies and treatment options. Patient's pain was likely started due to a combination of factors that can include but are not limited to worn or improper shoe gear, sudden change in shoe gear, change in activity, imbalanced biomechanics (such as the patient's bilateral equinus deformity of the calf muscles).  ---Surgery can be considered if conservative treatment options are fully exhausted and are not decreasing the pain, but conservative measures usually resolve this pain with time.  ---Discussed conservative treatment options including compression socks, icing, elevating, resting, injections, PT, change in shoe gear (including proper shoe gear around the  house), night splints. At this time, patient would like to proceed with the below treatment options:    Consider calling [facility] to schedule an appointment with the orthotist, Kwabena Lai. He schedules appointments on Thursdays. He is located at the [facility] in [place] on Thursdays and [place] on Wednesdays.    -Stability Shoe Gear: This involves wearing a solid tennis shoe that bends at the toe, but has a solid midfoot portion of the shoe that does not bend or twist in half, and a rigid heel contour.   -----Velasquez, Asics, and New Balance are a few brands that have several types of stability tennis shoes. However, these brands also carry lightweight shoes that do not meet the above criteria, so look for a stability tennis shoe.  -----Velasquez tend to have a wider toe box if you have difficulty finding wide enough shoes for your feet.   -----Any brand of can be worn as long as it meets the above three criteria.  -Stretching: Stretch the calf muscles to increase flexibility of the calf muscles. If possible, aim to stretch the calf muscles for a combined total of one hour per day.  -Icing: Ice the painful area of the foot minimally once a day for ten minutes per foot (can be a frozen water bottle if pain is on the bottom surface of the foot). Ice after any extended amount of time on your feet.  -Consider supportive sandals for around the house (such as [place], Vionics, Birkenstocks, Keens, Merrells, or Oofos sandals)    -Patient has declined an injection and PT today but she will consider this if pain worsens.    -This is an acute, uncomplicated illness/injury with OTC treatment options reviewed.    --------------------------------    -Toenail debridement x 10 toenails without incident on 09/11/2024    CKD: Patient's CKDis managed by their PCP. The kidney function appears to be stable. Patient's last GFR was 56 on 06/12/2024. The reduced kidney function contributes to increased edema in the foot.     -Patient in  agreement with the above treatment plan and all of patient's questions were answered.      Return to clinic 63+ days for toenail debridement        Mavis Crowe DPM

## 2024-09-11 NOTE — PATIENT INSTRUCTIONS
onsider calling [facility] to schedule an appointment with the orthotist, Kwabena Lai. He schedules appointments on Thursdays. He is located at the [facility] in [place] on Thursdays and [place] on Wednesdays.  ---Let Kwabena know you have the following: Gypsy has pain int he LEFT plantar central heel. Please fit hr for orthotics with additional offloading / cushioning for the heels. Thank you     --------------------------------------      -Stability Shoe Gear: This involves wearing a solid tennis shoe that bends at the toe, but has a solid midfoot portion of the shoe that does not bend or twist in half, and a rigid heel contour.   -----Velasquez, Asics, and New Balance are a few brands that have several types of stability tennis shoes. However, these brands also carry lightweight shoes that do not meet the above criteria, so look for a stability tennis shoe.  -----Velasquez tend to have a wider toe box if you have difficulty finding wide enough shoes for your feet.   -----Any brand of can be worn as long as it meets the above three criteria.  -Stretching: Stretch the calf muscles to increase flexibility of the calf muscles. If possible, aim to stretch the calf muscles for a combined total of one hour per day.  -Icing: Ice the painful area of the foot minimally once a day for ten minutes per foot (can be a frozen water bottle if pain is on the bottom surface of the foot). Ice after any extended amount of time on your feet.  -Consider supportive sandals for around the house (such as [place], Vionics, Birkenstocks, Keens, Merrells, or Oofos sandals)

## 2024-10-09 ENCOUNTER — TELEPHONE (OUTPATIENT)
Dept: FAMILY MEDICINE | Facility: OTHER | Age: 74
End: 2024-10-09

## 2024-10-09 ENCOUNTER — OFFICE VISIT (OUTPATIENT)
Dept: FAMILY MEDICINE | Facility: OTHER | Age: 74
End: 2024-10-09
Attending: FAMILY MEDICINE
Payer: COMMERCIAL

## 2024-10-09 VITALS
WEIGHT: 146.1 LBS | SYSTOLIC BLOOD PRESSURE: 168 MMHG | BODY MASS INDEX: 22.93 KG/M2 | TEMPERATURE: 96.7 F | HEIGHT: 67 IN | DIASTOLIC BLOOD PRESSURE: 83 MMHG | OXYGEN SATURATION: 100 % | RESPIRATION RATE: 16 BRPM | HEART RATE: 60 BPM

## 2024-10-09 DIAGNOSIS — H92.01 RIGHT EAR PAIN: Primary | ICD-10-CM

## 2024-10-09 DIAGNOSIS — H66.90 ACUTE OTITIS MEDIA, UNSPECIFIED OTITIS MEDIA TYPE: ICD-10-CM

## 2024-10-09 DIAGNOSIS — I10 BENIGN ESSENTIAL HYPERTENSION: ICD-10-CM

## 2024-10-09 PROCEDURE — G2211 COMPLEX E/M VISIT ADD ON: HCPCS | Performed by: FAMILY MEDICINE

## 2024-10-09 PROCEDURE — 99213 OFFICE O/P EST LOW 20 MIN: CPT | Performed by: FAMILY MEDICINE

## 2024-10-09 PROCEDURE — G0463 HOSPITAL OUTPT CLINIC VISIT: HCPCS

## 2024-10-09 RX ORDER — AZITHROMYCIN 250 MG/1
TABLET, FILM COATED ORAL
Qty: 6 TABLET | Refills: 0 | Status: SHIPPED | OUTPATIENT
Start: 2024-10-09 | End: 2024-10-14

## 2024-10-09 ASSESSMENT — ENCOUNTER SYMPTOMS
SINUS PRESSURE: 0
SORE THROAT: 1
SINUS PAIN: 0
COUGH: 0
SHORTNESS OF BREATH: 0
CHILLS: 0
FEVER: 0

## 2024-10-09 NOTE — PROGRESS NOTES
Assessment & Plan     Right ear pain / Acute otitis media, unspecified otitis media type  Sharp/stabbing pain in right ear with discomfort radiating down the right neck. Right ear was notable for cloudy tympanic membrane.   Allergy to penicillin, declines trying abx with possible cross rxn  - azithromycin (ZITHROMAX) 250 MG tablet; Take 2 tablets (500 mg) by mouth daily for 1 day, THEN 1 tablet (250 mg) daily for 4 days.    Essential HTN  lisinopril made her drowsy.Gypsy will take her BP at home and send us the results in two weeks  - if BP above goal, consideration for starting losartan.    The longitudinal plan of care for the diagnosis(es)/condition(s) as documented were addressed during this visit. Due to the added complexity in care, I will continue to support Gypsy in the subsequent management and with ongoing continuity of care.    Return if symptoms worsen or fail to improve.    Subjective   Gypsy is a 74 year old, presenting for the following health issues:   Ear Problem        10/9/2024     1:17 PM   Additional Questions   Roomed by josi madden   Accompanied by none         10/9/2024     1:17 PM   Patient Reported Additional Medications   Patient reports taking the following new medications none       Concern - Ear Ache  Onset: 10/7  Description: pt has been having issues with right ear since Monday 10/7, states feels like a jabbing pain, states hurts in throat  Intensity: severe  Progression of Symptoms:  worsening  Accompanying Signs & Symptoms: none  Previous history of similar problem: N/A  Precipitating factors:        Worsened by: chewing/swallowing  Alleviating factors:        Improved by: none  Therapies tried and outcome: ear drops and heating pad      # HTN  BP Readings from Last 6 Encounters:   10/09/24 (!) 168/83   09/11/24 (!) 153/74   08/19/24 138/62   06/12/24 136/72   05/07/24 (!) 148/64   12/17/21 (!) 140/58           Review of Systems  Review of Systems   Constitutional:  Negative  "for chills and fever.   HENT:  Positive for ear pain and sore throat. Negative for ear discharge, sinus pressure and sinus pain.    Respiratory:  Negative for cough and shortness of breath.           Objective    BP (!) 170/73 (BP Location: Left arm, Patient Position: Sitting, Cuff Size: Adult Large)   Pulse 60   Temp (!) 96.7  F (35.9  C) (Tympanic)   Resp 16   Ht 1.702 m (5' 7\")   Wt 66.3 kg (146 lb 1.6 oz)   SpO2 100%   BMI 22.88 kg/m    Body mass index is 22.88 kg/m .  Physical Exam   GENERAL: alert and no distress  HENT: normal cephalic/atraumatic, right ear: mucopurulent effusion, nose and mouth without ulcers or lesions, oropharynx clear, and oral mucous membranes moist  NECK: no adenopathy, no asymmetry, masses, or scars  RESP: lungs clear to auscultation - no rales, rhonchi or wheezes  CV: regular rate and rhythm, normal S1 S2, no S3 or S4, no murmur, click or rub, no peripheral edema  ABDOMEN: soft, non-tender  MS: no gross musculoskeletal defects noted, no edema    Carol Poe, MS2, [facility]       I was present with the medical student who participated in the service and in the documentation of the note. I have verified the history and personally performed the physical exam and medical decision making. I agree with the assessment and plan of care as documented in the note.       Signed Electronically by: Kaylen Hawkins MD        "

## 2024-10-09 NOTE — TELEPHONE ENCOUNTER
8:02 AM    Reason for Call: OVERBOOK    Patient is having the following symptoms: Patient needs to be seen  for ear ache since Monday. She has been doing heat and drops for it. days.    The patient is requesting an appointment for [employer] with .    Was an appointment offered for this call? No  If yes : Appointment type              Date    Preferred method for responding to this message: Telephone Call  What is your phone number ?  [phone]    If we cannot reach you directly, may we leave a detailed response at the number you provided? Yes    Can this message wait until your PCP/provider returns, if unavailable today? Provider    Erlinda Greene

## 2024-10-28 ENCOUNTER — TELEPHONE (OUTPATIENT)
Dept: FAMILY MEDICINE | Facility: OTHER | Age: 74
End: 2024-10-28

## 2024-10-28 ENCOUNTER — MEDICAL CORRESPONDENCE (OUTPATIENT)
Dept: HEALTH INFORMATION MANAGEMENT | Facility: CLINIC | Age: 74
End: 2024-10-28

## 2024-10-28 NOTE — TELEPHONE ENCOUNTER
1:04 PM    Reason for Call: Phone Call    Description: patient called to report BP readings for 13 days sometimes twice a day. 10/11: 139/71 pulse was 67, 10/14: 157/74 Pulse 68; 10/16: 130/60 Pulse 74; 10/18: 156/72 pulse was 53; 10/20: 151/75 pulse 64, 134/71 Pulse 61; 10/21: 148/68 pulse 68, 136/60 pulse 75; 10/22: 138/74 pulse 64; 10/23: 146/66 pulse 64; 10/24: 165/77, pulse was 75; 10/25: 167/79, pulse was 66, 168/76 pulse 61; 10/26: 137/77 pulse 64; 10/27 164/79 pulse 63, 139/84 pulse 67; 10/28: 171/81 pulse 62, 135/63 pulse 70.   She doesn't want to take BP pills.    Was an appointment offered for this call? No  If yes : Appointment type              Date    Preferred method for responding to this message: Telephone Call  What is your phone number ? [phone]     If we cannot reach you directly, may we leave a detailed response at the number you provided? Yes    Can this message wait until your PCP/provider returns, if available today? Not applicable    Nani Bernardo

## 2024-11-14 ENCOUNTER — OFFICE VISIT (OUTPATIENT)
Dept: PODIATRY | Facility: OTHER | Age: 74
End: 2024-11-14
Attending: PODIATRIST
Payer: COMMERCIAL

## 2024-11-14 VITALS
HEART RATE: 69 BPM | SYSTOLIC BLOOD PRESSURE: 144 MMHG | TEMPERATURE: 97.6 F | OXYGEN SATURATION: 95 % | DIASTOLIC BLOOD PRESSURE: 74 MMHG

## 2024-11-14 DIAGNOSIS — L60.3 ONYCHODYSTROPHY: Primary | ICD-10-CM

## 2024-11-14 DIAGNOSIS — M76.72 PERONEAL TENDINITIS OF LEFT LOWER EXTREMITY: ICD-10-CM

## 2024-11-14 DIAGNOSIS — N18.31 STAGE 3A CHRONIC KIDNEY DISEASE (H): ICD-10-CM

## 2024-11-14 PROCEDURE — 11721 DEBRIDE NAIL 6 OR MORE: CPT | Performed by: PODIATRIST

## 2024-11-14 PROCEDURE — G0463 HOSPITAL OUTPT CLINIC VISIT: HCPCS

## 2024-11-14 ASSESSMENT — PAIN SCALES - GENERAL: PAINLEVEL_OUTOF10: NO PAIN (1)

## 2024-11-14 NOTE — PROGRESS NOTES
Chief complaint: Patient presents with:  Toenail      History of Present Illness: This 74 year old female is seen at the request of No ref. provider found for evaluation and suggestions of management of heel pain and for high risk nail debridement.    Her nails are thick and difficult to trim so she would like it trimmed today.    She has Suly orthotics. Her plantar fascia pain has resolved, but she does have pain in the LEFT lateral ankle and the rim of the heel. This pain occurs randomly and sometimes when she is sitting and sometimes when she is walking. She is looking for treatment options for the foot pain.    No further pedal complaints today.        BP (!) 144/74 (BP Location: Left arm, Patient Position: Sitting, Cuff Size: Adult Regular)   Pulse 69   Temp 97.6  F (36.4  C) (Tympanic)   SpO2 95%     Patient Active Problem List   Diagnosis    HCD (health care directive)    Family history of heart disease    Lumbago    Osteoarthrosis involving lower leg    Plantar fasciitis    Abnormal stress test on 11/27/2019    Other chest pain    Mixed hyperlipidemia    Elevated coronary artery calcium score on 11/22/2019    Right lower lobe pulmonary nodule - stable for 4 years. no further imaging required    Left bundle branch block (LBBB)    Coronary artery disease involving native coronary artery of native heart without angina pectoris    H/O cardiac catheterization at [facility] on 8/24/2021.    Chronic kidney disease, stage 3 (H)    Benign essential hypertension    Restless leg syndrome       Past Surgical History:   Procedure Laterality Date    APPENDECTOMY  1964    ARTHROSCOPY KNEE Left 1989    HYSTERECTOMY  1974    HYSTERECTOMY TOTAL ABDOMINAL  2001    TONSILLECTOMY  1954       Current Outpatient Medications   Medication Sig Dispense Refill    B Complex-Biotin-FA (SUPER B-COMPLEX) TABS Take 1 tablet by mouth daily      diclofenac (VOLTAREN) 1 % topical gel Apply 2 g topically 4 times daily as needed for  moderate pain 350 g 1    ELDERBERRY PO Take 50 mg by mouth daily as needed Seasonally (September to April)      Ginger, Zingiber officinalis, (GINGER ROOT) 550 MG CAPS capsule Take 550 mg by mouth daily      l-lysine HCl 500 MG TABS tablet Take 500 mg by mouth daily      Multiple Minerals-Vitamins (CALCIUM-MAGNESIUM-ZINC-D3 PO) Take 1 tablet by mouth daily      Multiple Vitamins-Minerals (HAIR SKIN & NAILS PO) Take 2 chew tab by mouth daily      Omega-3 Fatty Acids (FISH OIL PO) Take 1 capsule by mouth daily 500mg fish oil/krill oil blend, 339 mg omega-3 fatty acids, 314 mg EPA and DHA      potassium 99 MG TABS Take 1 tablet by mouth daily      Turmeric (QC TUMERIC COMPLEX PO) Take 1 capsule by mouth daily Turmeric 750 mg, ginger root powder 50 mg, black pepper root extract 5 mg      UNABLE TO FIND 2 drops MEDICATION NAME: Calea - 1 dropper daily.      cholecalciferol (VITAMIN D3) 125 mcg (5000 units) capsule Take 1 capsule by mouth daily (Patient not taking: Reported on 11/14/2024)       No current facility-administered medications for this visit.          Allergies   Allergen Reactions    Bee Venom      unknown reaction    Iodine      unknown reaction    Penicillins      Childhood    Etodolac Rash       Family History   Problem Relation Age of Onset    Myocardial Infarction Mother     Alcoholism Father         Shot deer hunting    Mental Illness Brother     Myocardial Infarction Brother     Myocardial Infarction Brother     Myocardial Infarction Brother     Myocardial Infarction Brother        Social History     Socioeconomic History    Marital status:      Spouse name: None    Number of children: 3    Years of education: None    Highest education level: None   Tobacco Use    Smoking status: Never    Smokeless tobacco: Never   Substance and Sexual Activity    Alcohol use: Not Currently     Comment: former    Drug use: Never     Social Determinants of Health     Financial Resource Strain: Low Risk   (5/7/2024)    Financial Resource Strain     Within the past 12 months, have you or your family members you live with been unable to get utilities (heat, electricity) when it was really needed?: No   Food Insecurity: Low Risk  (5/7/2024)    Food Insecurity     Within the past 12 months, did you worry that your food would run out before you got money to buy more?: No     Within the past 12 months, did the food you bought just not last and you didn t have money to get more?: No   Transportation Needs: Low Risk  (5/7/2024)    Transportation Needs     Within the past 12 months, has lack of transportation kept you from medical appointments, getting your medicines, non-medical meetings or appointments, work, or from getting things that you need?: No   Physical Activity: Unknown (5/7/2024)    Exercise Vital Sign     Days of Exercise per Week: 4 days   Stress: Stress Concern Present (5/7/2024)    [employer] - Occupational Stress Questionnaire     Feeling of Stress : To some extent   Social Connections: Unknown (5/7/2024)    Social Connection and Isolation Panel [NHANES]     Frequency of Social Gatherings with Friends and Family: Once a week   Interpersonal Safety: Low Risk  (9/11/2024)    Interpersonal Safety     Do you feel physically and emotionally safe where you currently live?: Yes     Within the past 12 months, have you been hit, slapped, kicked or otherwise physically hurt by someone?: No     Within the past 12 months, have you been humiliated or emotionally abused in other ways by your partner or ex-partner?: No   Housing Stability: Low Risk  (5/7/2024)    Housing Stability     Do you have housing? : Yes     Are you worried about losing your housing?: No       ROS: 10 point ROS neg other than the symptoms noted above in the HPI.  EXAM  Constitutional: healthy, alert, and no distress    Psychiatric: mentation appears normal and affect normal/bright    VASCULAR:  -Dorsalis pedis pulse +2/4  b/l  -Posterior tibial pulse +2/4 b/l  -Capillary refill time < 3 seconds to b/l hallux  -Hair growth Present to b/l anterior legs and ankles  NEURO:  -Light touch sensation intact to b/l plantar forefoot  DERM:  -Skin temperature, texture and turgor WNL b/l  -Toenails elongated, thickened, dystrophic and discolored x 10  MSK:  -No further pain on palpation to the plantar central heel  -Mild tenderness on the LEFT lateral peroneal tendon (posterior to the lateral malleolus)  -Muscle strength of ankles +5/5 for dorsiflexion, plantarflexion, ABDUction and ADDuction b/l    -Ankle joint passive ROM within normal limits except for dorsiflexion:    Dorsiflexion, RIGHT Straight knee 0 degrees    Dorsiflexion, LEFT Straight knee 0 degrees    ============================================================    ASSESSMENT:    (L60.3) Onychodystrophy  (primary encounter diagnosis)    (M76.72) Peroneal tendinitis of left lower extremity    (N18.31) Stage 3a chronic kidney disease (H)        PLAN:  -Patient evaluated and examined. Treatment options discussed with no educational barriers noted.    Peroneal tendonitis:  -Discussed peroneal tendon pain including potential etiologies and treatment options. Patient's pain was likely started due to a combination of factors that can include but are not limited to worn or improper shoe gear, trauma, overuse, sudden change in shoe gear, change in activity, imbalanced biomechanics (such as pes cavus, pes planus, gastroc equinus deformity of the calf muscles).    ---Discussed conservative treatment options including compression socks, icing, elevating, resting, orthotics, physical therapy, change in shoe gear (including proper shoe gear around the house). At this time, patient would like to proceed with the below treatment options:    -Stability Shoe Gear: This involves wearing a solid tennis shoe that bends at the toe, but has a solid midfoot portion of the shoe that does not bend or twist in  half, and a rigid heel contour.  She is advised to more consistently wear stability tennis shoes.  -Icing: Ice the painful area of the foot/ankle once a day for ten minutes per foot. Ice after any extended amount of time on your feet.  -Consider supportive sandals for around the house (such as Raymond, Vionics, Birkenstocks, Keens, Merrells, or Oofos sandals)  -Orthotics: Patient has orthotics from [employer]. She is advised to bring them back to the orthotist to have them modified to add a midl valgus tilt to offload the peroneal tendons.  -Will add physical therapy if the pain does not reduce.    -This is an acute, uncomplicated illness/injury with OTC treatment options reviewed.    --------------------------------    -Toenail debridement x 10 toenails without incident on 11/13/2024    CKD: Patient's CKDis managed by their PCP. The kidney function appears to be stable. Patient's last GFR was 56 on 06/12/2024. The reduced kidney function contributes to increased edema in the foot.     -Patient in agreement with the above treatment plan and all of patient's questions were answered.      Return to clinic 63+ days for toenail debridement and to evaluate LEFT peroneal tendon pain        Mavis Crowe DPM

## 2024-11-20 ENCOUNTER — LAB (OUTPATIENT)
Dept: LAB | Facility: OTHER | Age: 74
End: 2024-11-20
Payer: COMMERCIAL

## 2024-11-20 DIAGNOSIS — E67.3 HIGH VITAMIN D LEVEL: ICD-10-CM

## 2024-11-20 PROCEDURE — 82306 VITAMIN D 25 HYDROXY: CPT | Mod: ZL

## 2024-11-20 PROCEDURE — 36415 COLL VENOUS BLD VENIPUNCTURE: CPT | Mod: ZL

## 2024-11-21 LAB — VIT D+METAB SERPL-MCNC: 62 NG/ML (ref 20–50)

## 2025-01-23 ENCOUNTER — OFFICE VISIT (OUTPATIENT)
Dept: PODIATRY | Facility: OTHER | Age: 75
End: 2025-01-23
Attending: PODIATRIST
Payer: COMMERCIAL

## 2025-01-23 VITALS
TEMPERATURE: 98.5 F | HEART RATE: 66 BPM | DIASTOLIC BLOOD PRESSURE: 75 MMHG | OXYGEN SATURATION: 95 % | SYSTOLIC BLOOD PRESSURE: 138 MMHG

## 2025-01-23 DIAGNOSIS — N18.31 STAGE 3A CHRONIC KIDNEY DISEASE (H): ICD-10-CM

## 2025-01-23 DIAGNOSIS — L60.3 ONYCHODYSTROPHY: Primary | ICD-10-CM

## 2025-01-23 DIAGNOSIS — M76.72 PERONEAL TENDINITIS OF LEFT LOWER EXTREMITY: ICD-10-CM

## 2025-01-23 PROCEDURE — 11721 DEBRIDE NAIL 6 OR MORE: CPT | Performed by: PODIATRIST

## 2025-01-23 PROCEDURE — G0463 HOSPITAL OUTPT CLINIC VISIT: HCPCS

## 2025-01-23 ASSESSMENT — PAIN SCALES - GENERAL: PAINLEVEL_OUTOF10: MILD PAIN (2)

## 2025-01-23 NOTE — PROGRESS NOTES
Chief complaint: Patient presents with:  Toenail: trimming        History of Present Illness: This 74 year old female is seen for follow-up management of peroneal pain and for high risk nail debridement.    Her nails are thick and difficult to trim so she would like it trimmed today.    She has Suly orthotics. Her plantar fascia pain is still resolved, but she still has pain on the lateral ankle. The pain is intermittent and she suddenly feels the pain at random times. She has Suly orthotics from [employer] that were adjusted by the orthotist and she is wearing good stability tennis shoes inside and outside the house. She is looking for more treatment options for when the lateral ankle causes increased pain.    No further pedal complaints today.        /75 (BP Location: Left arm, Patient Position: Sitting, Cuff Size: Adult Regular)   Pulse 66   Temp 98.5  F (36.9  C) (Tympanic)   SpO2 95%      Patient Active Problem List   Diagnosis    HCD (health care directive)    Family history of heart disease    Lumbago    Osteoarthrosis involving lower leg    Plantar fasciitis    Abnormal stress test on 11/27/2019    Other chest pain    Mixed hyperlipidemia    Elevated coronary artery calcium score on 11/22/2019    Right lower lobe pulmonary nodule - stable for 4 years. no further imaging required    Left bundle branch block (LBBB)    Coronary artery disease involving native coronary artery of native heart without angina pectoris    H/O cardiac catheterization at [facility] on 8/24/2021.    Chronic kidney disease, stage 3 (H)    Benign essential hypertension    Restless leg syndrome       Past Surgical History:   Procedure Laterality Date    APPENDECTOMY  1964    ARTHROSCOPY KNEE Left 1989    HYSTERECTOMY  1974    HYSTERECTOMY TOTAL ABDOMINAL  2001    TONSILLECTOMY  1954       Current Outpatient Medications   Medication Sig Dispense Refill    B Complex-Biotin-FA (SUPER B-COMPLEX) TABS Take 1 tablet by mouth daily       cholecalciferol (VITAMIN D3) 125 mcg (5000 units) capsule Take 1 capsule by mouth daily.      diclofenac (VOLTAREN) 1 % topical gel Apply 2 g topically 4 times daily as needed for moderate pain 350 g 1    ELDERBERRY PO Take 50 mg by mouth daily as needed Seasonally (September to April)      Ginger, Zingiber officinalis, (GINGER ROOT) 550 MG CAPS capsule Take 550 mg by mouth daily      l-lysine HCl 500 MG TABS tablet Take 500 mg by mouth daily      Multiple Minerals-Vitamins (CALCIUM-MAGNESIUM-ZINC-D3 PO) Take 1 tablet by mouth daily      Multiple Vitamins-Minerals (HAIR SKIN & NAILS PO) Take 2 chew tab by mouth daily      Omega-3 Fatty Acids (FISH OIL PO) Take 1 capsule by mouth daily 500mg fish oil/krill oil blend, 339 mg omega-3 fatty acids, 314 mg EPA and DHA      potassium 99 MG TABS Take 1 tablet by mouth daily      Turmeric (QC TUMERIC COMPLEX PO) Take 1 capsule by mouth daily Turmeric 750 mg, ginger root powder 50 mg, black pepper root extract 5 mg      UNABLE TO FIND 2 drops MEDICATION NAME: Chaga - 1 dropper daily.       No current facility-administered medications for this visit.          Allergies   Allergen Reactions    Bee Venom      unknown reaction    Iodine      unknown reaction    Penicillins      Childhood    Etodolac Rash       Family History   Problem Relation Age of Onset    Myocardial Infarction Mother     Alcoholism Father         Shot deer hunting    Mental Illness Brother     Myocardial Infarction Brother     Myocardial Infarction Brother     Myocardial Infarction Brother     Myocardial Infarction Brother        Social History     Socioeconomic History    Marital status:      Spouse name: None    Number of children: 3    Years of education: None    Highest education level: None   Tobacco Use    Smoking status: Never    Smokeless tobacco: Never   Substance and Sexual Activity    Alcohol use: Not Currently     Comment: former    Drug use: Never     Social Determinants of Health      Financial Resource Strain: Low Risk  (5/7/2024)    Financial Resource Strain     Within the past 12 months, have you or your family members you live with been unable to get utilities (heat, electricity) when it was really needed?: No   Food Insecurity: Low Risk  (5/7/2024)    Food Insecurity     Within the past 12 months, did you worry that your food would run out before you got money to buy more?: No     Within the past 12 months, did the food you bought just not last and you didn t have money to get more?: No   Transportation Needs: Low Risk  (5/7/2024)    Transportation Needs     Within the past 12 months, has lack of transportation kept you from medical appointments, getting your medicines, non-medical meetings or appointments, work, or from getting things that you need?: No   Physical Activity: Unknown (5/7/2024)    Exercise Vital Sign     Days of Exercise per Week: 4 days   Stress: Stress Concern Present (5/7/2024)    [employer] - Occupational Stress Questionnaire     Feeling of Stress : To some extent   Social Connections: Unknown (5/7/2024)    Social Connection and Isolation Panel [NHANES]     Frequency of Social Gatherings with Friends and Family: Once a week   Interpersonal Safety: Low Risk  (9/11/2024)    Interpersonal Safety     Do you feel physically and emotionally safe where you currently live?: Yes     Within the past 12 months, have you been hit, slapped, kicked or otherwise physically hurt by someone?: No     Within the past 12 months, have you been humiliated or emotionally abused in other ways by your partner or ex-partner?: No   Housing Stability: Low Risk  (5/7/2024)    Housing Stability     Do you have housing? : Yes     Are you worried about losing your housing?: No       ROS: 10 point ROS neg other than the symptoms noted above in the HPI.  EXAM  Constitutional: healthy, alert, and no distress    Psychiatric: mentation appears normal and affect  normal/bright    VASCULAR:  -Dorsalis pedis pulse +2/4 b/l  -Posterior tibial pulse +2/4 b/l  -Capillary refill time < 3 seconds to b/l hallux  -Hair growth Present to b/l anterior legs and ankles  NEURO:  -Light touch sensation intact to b/l plantar forefoot  DERM:  -Skin temperature, texture and turgor WNL b/l  -Toenails elongated, thickened, dystrophic and discolored x 10  MSK:  -No further pain on palpation to the plantar central heel  -Pain on palpation to the LEFT lateral peroneal tendon (posterior to the lateral malleolus)  -Muscle strength of ankles +5/5 for dorsiflexion, plantarflexion, ABDUction and ADDuction b/l    -Ankle joint passive ROM within normal limits except for dorsiflexion:    Dorsiflexion, RIGHT Straight knee 0 degrees    Dorsiflexion, LEFT Straight knee 0 degrees    ============================================================    ASSESSMENT:    (L60.3) Onychodystrophy  (primary encounter diagnosis)    (M76.72) Peroneal tendinitis of left lower extremity    (N18.31) Stage 3a chronic kidney disease (H)        PLAN:  -Patient evaluated and examined. Treatment options discussed with no educational barriers noted.    Peroneal tendonitis:  -Discussed peroneal tendon pain including potential etiologies and treatment options. Patient still has her peroneal tendon pain.    -Continue wearing Stability Shoe Gear: This involves wearing a solid tennis shoe that bends at the toe, but has a solid midfoot portion of the shoe that does not bend or twist in half, and a rigid heel contour.  She is advised to more consistently wear stability tennis shoes.  -Icing: Ice the painful area of the foot/ankle once a day for ten minutes per foot. Ice after any extended amount of time on your feet.  -Consider supportive sandals for around the house (such as Gays, Vionics, Birkenstocks, Keens, Merrells, or Oofos sandals)  -Orthotics: Patient has orthotics from [employer]. She is advised to bring them back to the orthotist  to have them modified to add a midl valgus tilt to offload the peroneal tendons. She did not have it adjusted. She may also remove the added arch fill temporarily as that may be tipping her ankle into a varus.  -Consider using Voltaren gel when the pain increases.    -Will add physical therapy if the pain does not reduce. Her pain is not consistent so she does not want this at this time.    -This is an acute, uncomplicated illness/injury with OTC treatment options reviewed.    --------------------------------    -Toenail debridement x 10 toenails without incident on 11/13/2024    CKD: Patient's CKDis managed by their PCP. The kidney function appears to be stable. Patient's last GFR was 56 on 06/12/2024. The reduced kidney function contributes to increased edema in the foot.     -Patient in agreement with the above treatment plan and all of patient's questions were answered.      Return to clinic 63+ days for toenail debridement and to evaluate LEFT peroneal tendon pain        Mavis Crowe DPM

## 2025-03-27 ENCOUNTER — OFFICE VISIT (OUTPATIENT)
Dept: PODIATRY | Facility: OTHER | Age: 75
End: 2025-03-27
Attending: PODIATRIST
Payer: COMMERCIAL

## 2025-03-27 ENCOUNTER — ANCILLARY PROCEDURE (OUTPATIENT)
Dept: GENERAL RADIOLOGY | Facility: OTHER | Age: 75
End: 2025-03-27
Attending: PODIATRIST
Payer: COMMERCIAL

## 2025-03-27 VITALS
RESPIRATION RATE: 18 BRPM | OXYGEN SATURATION: 95 % | SYSTOLIC BLOOD PRESSURE: 147 MMHG | TEMPERATURE: 96.9 F | DIASTOLIC BLOOD PRESSURE: 79 MMHG | HEART RATE: 71 BPM

## 2025-03-27 DIAGNOSIS — M79.671 RIGHT FOOT PAIN: ICD-10-CM

## 2025-03-27 DIAGNOSIS — M79.672 LEFT FOOT PAIN: ICD-10-CM

## 2025-03-27 DIAGNOSIS — N18.31 STAGE 3A CHRONIC KIDNEY DISEASE (H): Primary | ICD-10-CM

## 2025-03-27 DIAGNOSIS — L60.3 ONYCHODYSTROPHY: ICD-10-CM

## 2025-03-27 DIAGNOSIS — L60.3 ONYCHODYSTROPHY: Primary | ICD-10-CM

## 2025-03-27 DIAGNOSIS — M62.462 GASTROCNEMIUS EQUINUS OF LEFT LOWER EXTREMITY: ICD-10-CM

## 2025-03-27 DIAGNOSIS — M60.861 OTHER MYOSITIS OF RIGHT LOWER EXTREMITY: ICD-10-CM

## 2025-03-27 DIAGNOSIS — M76.72 PERONEAL TENDINITIS OF LEFT LOWER EXTREMITY: ICD-10-CM

## 2025-03-27 DIAGNOSIS — M60.862 OTHER MYOSITIS OF LEFT LOWER EXTREMITY: ICD-10-CM

## 2025-03-27 DIAGNOSIS — M62.461 GASTROCNEMIUS EQUINUS OF RIGHT LOWER EXTREMITY: ICD-10-CM

## 2025-03-27 DIAGNOSIS — N18.31 STAGE 3A CHRONIC KIDNEY DISEASE (H): ICD-10-CM

## 2025-03-27 PROCEDURE — 11721 DEBRIDE NAIL 6 OR MORE: CPT | Performed by: PODIATRIST

## 2025-03-27 PROCEDURE — 73630 X-RAY EXAM OF FOOT: CPT | Mod: TC,RT

## 2025-03-27 PROCEDURE — 73630 X-RAY EXAM OF FOOT: CPT | Mod: TC,LT

## 2025-03-27 PROCEDURE — G0463 HOSPITAL OUTPT CLINIC VISIT: HCPCS

## 2025-03-27 ASSESSMENT — PAIN SCALES - GENERAL: PAINLEVEL_OUTOF10: SEVERE PAIN (8)

## 2025-03-27 NOTE — PROGRESS NOTES
Chief complaint: Patient presents with:  DFE        History of Present Illness: This 74 year old female with CKD IIIa is seen for follow-up management of peroneal pain and for high risk nail debridement.    Her nails are thick and difficult to trim so she would like it trimmed today.    She says her heel pain is increasing again. She is having pain on the medial and lateral heel.  Her shoe was stretched again. She has had orthotics adjusted several times through the orthotist, Kwabena Lai, at [facility].      ***christine PT  Sergei Stanley   Soap  Trigger point pain          She has Suly orthotics. Her plantar fascia pain is still resolved, but she still has pain on the lateral ankle.  *** The pain is intermittent and she suddenly feels the pain at random times. She has Suly orthotics from [facility] that were adjusted by the orthotist and she is wearing good stability tennis shoes inside and outside the house. She is looking for more treatment options for when the lateral ankle causes increased pain.    No further pedal complaints today.        There were no vitals taken for this visit. ***    Patient Active Problem List   Diagnosis    HCD (health care directive)    Family history of heart disease    Lumbago    Osteoarthrosis involving lower leg    Plantar fasciitis    Abnormal stress test on 11/27/2019    Other chest pain    Mixed hyperlipidemia    Elevated coronary artery calcium score on 11/22/2019    Right lower lobe pulmonary nodule - stable for 4 years. no further imaging required    Left bundle branch block (LBBB)    Coronary artery disease involving native coronary artery of native heart without angina pectoris    H/O cardiac catheterization at [facility] on 8/24/2021.    Chronic kidney disease, stage 3 (H)    Benign essential hypertension    Restless leg syndrome       Past Surgical History:   Procedure Laterality Date    APPENDECTOMY  1964    ARTHROSCOPY KNEE Left 1989    HYSTERECTOMY  1974    HYSTERECTOMY  TOTAL ABDOMINAL  2001    TONSILLECTOMY  1954       Current Outpatient Medications   Medication Sig Dispense Refill    B Complex-Biotin-FA (SUPER B-COMPLEX) TABS Take 1 tablet by mouth daily      cholecalciferol (VITAMIN D3) 125 mcg (5000 units) capsule Take 1 capsule by mouth daily.      diclofenac (VOLTAREN) 1 % topical gel Apply 2 g topically 4 times daily as needed for moderate pain 350 g 1    ELDERBERRY PO Take 50 mg by mouth daily as needed Seasonally (September to April)      Ginger, Zingiber officinalis, (GINGER ROOT) 550 MG CAPS capsule Take 550 mg by mouth daily      l-lysine HCl 500 MG TABS tablet Take 500 mg by mouth daily      Multiple Minerals-Vitamins (CALCIUM-MAGNESIUM-ZINC-D3 PO) Take 1 tablet by mouth daily      Multiple Vitamins-Minerals (HAIR SKIN & NAILS PO) Take 2 chew tab by mouth daily      Omega-3 Fatty Acids (FISH OIL PO) Take 1 capsule by mouth daily 500mg fish oil/krill oil blend, 339 mg omega-3 fatty acids, 314 mg EPA and DHA      potassium 99 MG TABS Take 1 tablet by mouth daily      Turmeric (QC TUMERIC COMPLEX PO) Take 1 capsule by mouth daily Turmeric 750 mg, ginger root powder 50 mg, black pepper root extract 5 mg      UNABLE TO FIND 2 drops MEDICATION NAME: Chaga - 1 dropper daily.       No current facility-administered medications for this visit.          Allergies   Allergen Reactions    Bee Venom      unknown reaction    Iodine      unknown reaction    Penicillins      Childhood    Etodolac Rash       Family History   Problem Relation Age of Onset    Myocardial Infarction Mother     Alcoholism Father         Shot deer hunting    Mental Illness Brother     Myocardial Infarction Brother     Myocardial Infarction Brother     Myocardial Infarction Brother     Myocardial Infarction Brother        Social History     Socioeconomic History    Marital status:      Spouse name: None    Number of children: 3    Years of education: None    Highest education level: None   Tobacco Use     Smoking status: Never    Smokeless tobacco: Never   Substance and Sexual Activity    Alcohol use: Not Currently     Comment: former    Drug use: Never     Social Determinants of Health     Financial Resource Strain: Low Risk  (5/7/2024)    Financial Resource Strain     Within the past 12 months, have you or your family members you live with been unable to get utilities (heat, electricity) when it was really needed?: No   Food Insecurity: Low Risk  (5/7/2024)    Food Insecurity     Within the past 12 months, did you worry that your food would run out before you got money to buy more?: No     Within the past 12 months, did the food you bought just not last and you didn t have money to get more?: No   Transportation Needs: Low Risk  (5/7/2024)    Transportation Needs     Within the past 12 months, has lack of transportation kept you from medical appointments, getting your medicines, non-medical meetings or appointments, work, or from getting things that you need?: No   Physical Activity: Unknown (5/7/2024)    Exercise Vital Sign     Days of Exercise per Week: 4 days   Stress: Stress Concern Present (5/7/2024)    [employer] [place] [employer] - Occupational Stress Questionnaire     Feeling of Stress : To some extent   Social Connections: Unknown (5/7/2024)    Social Connection and Isolation Panel [NHANES]     Frequency of Social Gatherings with Friends and Family: Once a week   Interpersonal Safety: Low Risk  (9/11/2024)    Interpersonal Safety     Do you feel physically and emotionally safe where you currently live?: Yes     Within the past 12 months, have you been hit, slapped, kicked or otherwise physically hurt by someone?: No     Within the past 12 months, have you been humiliated or emotionally abused in other ways by your partner or ex-partner?: No   Housing Stability: Low Risk  (5/7/2024)    Housing Stability     Do you have housing? : Yes     Are you worried about losing your housing?: No        ROS: 10 point ROS neg other than the symptoms noted above in the HPI.  EXAM  Constitutional: healthy, alert, and no distress    Psychiatric: mentation appears normal and affect normal/bright    VASCULAR:  -Dorsalis pedis pulse +2/4 b/l  -Posterior tibial pulse +2/4 b/l  -Capillary refill time < 3 seconds to b/l hallux  -Hair growth Present to b/l anterior legs and ankles  NEURO:  -Light touch sensation intact to b/l plantar forefoot  DERM:  -Skin temperature, texture and turgor WNL b/l  -Toenails elongated, thickened, dystrophic and discolored x 10  MSK:  -No further pain on palpation to the plantar central heel  -Pain on palpation to the LEFT lateral peroneal tendon (posterior to the lateral malleolus)  -Muscle strength of ankles +5/5 for dorsiflexion, plantarflexion, ABDUction and ADDuction b/l    -Ankle joint passive ROM within normal limits except for dorsiflexion:    Dorsiflexion, RIGHT Straight knee 0 degrees    Dorsiflexion, LEFT Straight knee 0 degrees    ============================================================    ASSESSMENT:    (L60.3) Onychodystrophy  (primary encounter diagnosis)    (M76.72) Peroneal tendinitis of left lower extremity    (N18.31) Stage 3a chronic kidney disease (H)        PLAN:  -Patient evaluated and examined. Treatment options discussed with no educational barriers noted.    Peroneal tendonitis:  -Discussed peroneal tendon pain including potential etiologies and treatment options. Patient still has her peroneal tendon pain.    -Continue wearing Stability Shoe Gear: This involves wearing a solid tennis shoe that bends at the toe, but has a solid midfoot portion of the shoe that does not bend or twist in half, and a rigid heel contour.  She is advised to more consistently wear stability tennis shoes.  -Icing: Ice the painful area of the foot/ankle once a day for ten minutes per foot. Ice after any extended amount of time on your feet.  -Consider supportive sandals for around the  catherine (such as Crestline, Vionics, Birkenstmarina, Keens, Peggys, or Alexios jeannie)  -Orthotics: Patient has orthotics from [facility]. She is advised to bring them back to the orthotist to have them modified to add a midl valgus tilt to offload the peroneal tendons. She did not have it adjusted. She may also remove the added arch fill temporarily as that may be tipping her ankle into a varus.  -Consider using Voltaren gel when the pain increases.    -Will add physical therapy if the pain does not reduce. Her pain is not consistent so she does not want this at this time.    -This is an acute, uncomplicated illness/injury with OTC treatment options reviewed.    --------------------------------    -Toenail debridement x 10 toenails without incident on 11/13/2024    CKD: Patient's CKDis managed by their PCP. The kidney function appears to be stable. Patient's last GFR was 56 on 06/12/2024. The reduced kidney function contributes to increased edema in the foot.     -Foot Education provided. This included checking the feet daily looking for new new blisters or wounds, wearing shoes at all times when walking including around the house, and avoiding lotion application between the toes. If there are any signs of infection, the patient should present to the ED as soon as possible. Infections of the foot can be life threatening or lead to amputations of the foot or leg.      -Patient in agreement with the above treatment plan and all of patient's questions were answered.      Return to clinic 63+ days for toenail debridement and to evaluate LEFT peroneal tendon pain        Mavis Crowe DPM   fascia on the RIGHT foot radiographs. Pain is still most likely from soft tissue edema versus the bone spur as the bone spur is parallel with the floor. Patient will be called with the results.    -Physical therapy referral placed through [facility] per patient request with Sergei Malik.    -Continue wearing Stability Shoe Gear: This involves wearing a solid tennis shoe that bends at the toe, but has a solid midfoot portion of the shoe that does not bend or twist in half, and a rigid heel contour.  She is advised to more consistently wear stability tennis shoes.  -Icing: Ice the painful area of the foot/ankle once a day for ten minutes per foot. Ice after any extended amount of time on your feet.  -Consider supportive sandals for around the house (such as Willis, Vionics, Birkenstocks, Keens, Merrells, or Oofos sandals)  -Orthotics: Patient has orthotics from [facility] [employer]. She has had the orthotics modified several times through the orthotist, Kwabena Lai.    -This is an acute, uncomplicated illness/injury with OTC treatment options reviewed.    --------------------------------    -Toenail debridement x 10 toenails without incident on 03/27/2025    CKD: Patient's CKDis managed by their PCP. The kidney function appears to be stable. Patient's last GFR was 56 on 06/12/2024. The reduced kidney function contributes to increased edema in the foot.     -Foot Education provided. This included checking the feet daily looking for new new blisters or wounds, wearing shoes at all times when walking including around the house, and avoiding lotion application between the toes. If there are any signs of infection, the patient should present to the ED as soon as possible. Infections of the foot can be life threatening or lead to amputations of the foot or leg.    -Patient in agreement with the above treatment plan and all of patient's questions were answered.        Return to clinic 63+ days for toenail debridement and  to evaluate bilateral heel pain and leg pain post physical therapy        Mavis Crowe DPM

## 2025-04-17 ENCOUNTER — TRANSFERRED RECORDS (OUTPATIENT)
Dept: HEALTH INFORMATION MANAGEMENT | Facility: CLINIC | Age: 75
End: 2025-04-17

## 2025-05-13 PROBLEM — Z98.890 H/O CARDIAC CATHETERIZATION: Status: ACTIVE | Noted: 2021-09-20

## 2025-05-15 ENCOUNTER — LAB (OUTPATIENT)
Dept: LAB | Facility: OTHER | Age: 75
End: 2025-05-15
Payer: COMMERCIAL

## 2025-05-15 ENCOUNTER — OFFICE VISIT (OUTPATIENT)
Dept: FAMILY MEDICINE | Facility: OTHER | Age: 75
End: 2025-05-15
Attending: FAMILY MEDICINE
Payer: COMMERCIAL

## 2025-05-15 ENCOUNTER — RESULTS FOLLOW-UP (OUTPATIENT)
Dept: FAMILY MEDICINE | Facility: OTHER | Age: 75
End: 2025-05-15

## 2025-05-15 VITALS
RESPIRATION RATE: 16 BRPM | HEART RATE: 95 BPM | TEMPERATURE: 98.4 F | WEIGHT: 152 LBS | OXYGEN SATURATION: 96 % | BODY MASS INDEX: 23.86 KG/M2 | DIASTOLIC BLOOD PRESSURE: 58 MMHG | SYSTOLIC BLOOD PRESSURE: 122 MMHG | HEIGHT: 67 IN

## 2025-05-15 DIAGNOSIS — I10 BENIGN ESSENTIAL HYPERTENSION: ICD-10-CM

## 2025-05-15 DIAGNOSIS — E78.2 MIXED HYPERLIPIDEMIA: ICD-10-CM

## 2025-05-15 DIAGNOSIS — Z00.00 ENCOUNTER FOR MEDICARE ANNUAL WELLNESS EXAM: Primary | ICD-10-CM

## 2025-05-15 DIAGNOSIS — Z12.31 ENCOUNTER FOR SCREENING MAMMOGRAM FOR BREAST CANCER: ICD-10-CM

## 2025-05-15 DIAGNOSIS — E67.3 HIGH VITAMIN D LEVEL: ICD-10-CM

## 2025-05-15 DIAGNOSIS — N18.31 STAGE 3A CHRONIC KIDNEY DISEASE (H): ICD-10-CM

## 2025-05-15 LAB
ALBUMIN SERPL BCG-MCNC: 4.4 G/DL (ref 3.5–5.2)
ALP SERPL-CCNC: 103 U/L (ref 40–150)
ALT SERPL W P-5'-P-CCNC: 37 U/L (ref 0–50)
ANION GAP SERPL CALCULATED.3IONS-SCNC: 11 MMOL/L (ref 7–15)
AST SERPL W P-5'-P-CCNC: 26 U/L (ref 0–45)
BILIRUB SERPL-MCNC: 0.5 MG/DL
BUN SERPL-MCNC: 23 MG/DL (ref 8–23)
CALCIUM SERPL-MCNC: 9.4 MG/DL (ref 8.8–10.4)
CHLORIDE SERPL-SCNC: 107 MMOL/L (ref 98–107)
CHOLEST SERPL-MCNC: 215 MG/DL
CREAT SERPL-MCNC: 0.96 MG/DL (ref 0.51–0.95)
EGFRCR SERPLBLD CKD-EPI 2021: 62 ML/MIN/1.73M2
ERYTHROCYTE [DISTWIDTH] IN BLOOD BY AUTOMATED COUNT: 11.9 % (ref 10–15)
FASTING STATUS PATIENT QL REPORTED: YES
FASTING STATUS PATIENT QL REPORTED: YES
GLUCOSE SERPL-MCNC: 116 MG/DL (ref 70–99)
HCO3 SERPL-SCNC: 24 MMOL/L (ref 22–29)
HCT VFR BLD AUTO: 38.5 % (ref 35–47)
HDLC SERPL-MCNC: 49 MG/DL
HGB BLD-MCNC: 13 G/DL (ref 11.7–15.7)
LDLC SERPL CALC-MCNC: 143 MG/DL
MCH RBC QN AUTO: 31.1 PG (ref 26.5–33)
MCHC RBC AUTO-ENTMCNC: 33.8 G/DL (ref 31.5–36.5)
MCV RBC AUTO: 92 FL (ref 78–100)
NONHDLC SERPL-MCNC: 166 MG/DL
PLATELET # BLD AUTO: 285 10E3/UL (ref 150–450)
POTASSIUM SERPL-SCNC: 4 MMOL/L (ref 3.4–5.3)
PROT SERPL-MCNC: 6.9 G/DL (ref 6.4–8.3)
RBC # BLD AUTO: 4.18 10E6/UL (ref 3.8–5.2)
SODIUM SERPL-SCNC: 142 MMOL/L (ref 135–145)
TRIGL SERPL-MCNC: 114 MG/DL
VIT D+METAB SERPL-MCNC: 58 NG/ML (ref 20–50)
WBC # BLD AUTO: 6.7 10E3/UL (ref 4–11)

## 2025-05-15 PROCEDURE — 82310 ASSAY OF CALCIUM: CPT | Mod: ZL

## 2025-05-15 PROCEDURE — 85018 HEMOGLOBIN: CPT | Mod: ZL

## 2025-05-15 PROCEDURE — 82465 ASSAY BLD/SERUM CHOLESTEROL: CPT | Mod: ZL

## 2025-05-15 PROCEDURE — 36415 COLL VENOUS BLD VENIPUNCTURE: CPT | Mod: ZL

## 2025-05-15 PROCEDURE — 82306 VITAMIN D 25 HYDROXY: CPT | Mod: ZL

## 2025-05-15 PROCEDURE — G0463 HOSPITAL OUTPT CLINIC VISIT: HCPCS

## 2025-05-15 SDOH — HEALTH STABILITY: PHYSICAL HEALTH: ON AVERAGE, HOW MANY DAYS PER WEEK DO YOU ENGAGE IN MODERATE TO STRENUOUS EXERCISE (LIKE A BRISK WALK)?: 5 DAYS

## 2025-05-15 SDOH — HEALTH STABILITY: PHYSICAL HEALTH: ON AVERAGE, HOW MANY MINUTES DO YOU ENGAGE IN EXERCISE AT THIS LEVEL?: 50 MIN

## 2025-05-15 ASSESSMENT — SOCIAL DETERMINANTS OF HEALTH (SDOH): HOW OFTEN DO YOU GET TOGETHER WITH FRIENDS OR RELATIVES?: THREE TIMES A WEEK

## 2025-05-15 ASSESSMENT — PAIN SCALES - GENERAL: PAINLEVEL_OUTOF10: NO PAIN (0)

## 2025-05-15 NOTE — PATIENT INSTRUCTIONS
Update your tetanus at your pharmacy     Patient Education   Preventive Care Advice   This is general advice given by our system to help you stay healthy. However, your care team may have specific advice just for you. Please talk to your care team about your preventive care needs.  Nutrition  Eat 5 or more servings of fruits and vegetables each day.  Try wheat bread, brown rice and whole grain pasta (instead of white bread, rice, and pasta).  Get enough calcium and vitamin D. Check the label on foods and aim for 100% of the RDA (recommended daily allowance).  Lifestyle  Exercise at least 150 minutes each week  (30 minutes a day, 5 days a week).  Do muscle strengthening activities 2 days a week. These help control your weight and prevent disease.  No smoking.  Wear sunscreen to prevent skin cancer.  Have a dental exam and cleaning every 6 months.  Yearly exams  See your health care team every year to talk about:  Any changes in your health.  Any medicines your care team has prescribed.  Preventive care, family planning, and ways to prevent chronic diseases.  Shots (vaccines)   HPV shots (up to age 26), if you've never had them before.  Hepatitis B shots (up to age 59), if you've never had them before.  COVID-19 shot: Get this shot when it's due.  Flu shot: Get a flu shot every year.  Tetanus shot: Get a tetanus shot every 10 years.  Pneumococcal, hepatitis A, and RSV shots: Ask your care team if you need these based on your risk.  Shingles shot (for age 50 and up)  General health tests  Diabetes screening:  Starting at age 35, Get screened for diabetes at least every 3 years.  If you are younger than age 35, ask your care team if you should be screened for diabetes.  Cholesterol test: At age 39, start having a cholesterol test every 5 years, or more often if advised.  Bone density scan (DEXA): At age 50, ask your care team if you should have this scan for osteoporosis (brittle bones).  Hepatitis C: Get tested at  least once in your life.  STIs (sexually transmitted infections)  Before age 24: Ask your care team if you should be screened for STIs.  After age 24: Get screened for STIs if you're at risk. You are at risk for STIs (including HIV) if:  You are sexually active with more than one person.  You don't use condoms every time.  You or a partner was diagnosed with a sexually transmitted infection.  If you are at risk for HIV, ask about PrEP medicine to prevent HIV.  Get tested for HIV at least once in your life, whether you are at risk for HIV or not.  Cancer screening tests  Cervical cancer screening: If you have a cervix, begin getting regular cervical cancer screening tests starting at age 21.  Breast cancer scan (mammogram): If you've ever had breasts, begin having regular mammograms starting at age 40. This is a scan to check for breast cancer.  Colon cancer screening: It is important to start screening for colon cancer at age 45.  Have a colonoscopy test every 10 years (or more often if you're at risk) Or, ask your provider about stool tests like a FIT test every year or Cologuard test every 3 years.  To learn more about your testing options, visit:   .  For help making a decision, visit:   https://bit.ly/th27321.  Prostate cancer screening test: If you have a prostate, ask your care team if a prostate cancer screening test (PSA) at age 55 is right for you.  Lung cancer screening: If you are a current or former smoker ages 50 to 80, ask your care team if ongoing lung cancer screenings are right for you.  For informational purposes only. Not to replace the advice of your health care provider. Copyright   2023 [employer] All rights reserved. Clinically reviewed by the [facility] Transitions Program. "[employer] 161638 - REV 01/24.

## 2025-05-15 NOTE — PROGRESS NOTES
Preventive Care Visit  [facility]  Kaylen Hawkins MD, Family Medicine  May 15, 2025      Assessment & Plan     Encounter for Medicare annual wellness exam  Discussed and updated preventive cares  Repeat wellness visit in one year     Mixed hyperlipidemia  Stable. Angiogram (8/2021) LAD with 40%  - Lipid Profile; Future  - declines statins    Benign essential hypertension  BP at goal   - CBC with platelets; Future  - Comprehensive metabolic panel; Future  - no HTN meds    Stage 3a chronic kidney disease (H)  elise    High vitamin D level  Has vitamin d in her supplements  - Vitamin D Deficiency; Future  - not taking extra vitamin d in the summertime  - Gypsy is wondering how much vitamin d she should take in the winter. We will wait for labs then decide    Encounter for screening mammogram for breast cancer  - MA Screen Bilateral w/Sarthak; Future    The longitudinal plan of care for the diagnosis(es)/condition(s) as documented were addressed during this visit. Due to the added complexity in care, I will continue to support Gypsy in the subsequent management and with ongoing continuity of care.        Counseling  Appropriate preventive services were addressed with this patient via screening, questionnaire, or discussion as appropriate for fall prevention, nutrition, physical activity, Tobacco-use cessation, social engagement, weight loss and cognition.  Checklist reviewing preventive services available has been given to the patient.  Reviewed patient's diet, addressing concerns and/or questions.   The patient was instructed to see the dentist every 6 months.   Patient reported safety concerns were addressed today.The patient was provided with written information regarding signs of hearing loss.   Information on urinary incontinence and treatment options given to patient.       Follow-up  Return in 1 year (on 5/15/2026) for Physical Exam, Lab Work.    Rayna Betancur is a 75 year old,  presenting for the following:  Physical, Lipids, and Hypertension        5/15/2025    10:19 AM   Additional Questions   Roomed by Merle Briceño   Accompanied by self         HPI     Hyperlipidemia Follow-Up    Are you regularly taking any medication or supplement to lower your cholesterol?   Yes- fish oil  Are you having muscle aches or other side effects that you think could be caused by your cholesterol lowering medication?  No    Hypertension Follow-up    Do you check your blood pressure regularly outside of the clinic? Yes   Are you following a low salt diet? No  Are your blood pressures ever more than 140 on the top number (systolic) OR more   than 90 on the bottom number (diastolic), for example 140/90? Yes    # S/P cardiac cath at [facility] on 8/24/2021. LAD with 40%. Remaining vessels with mild disease    BP Readings from Last 2 Encounters:   05/15/25 122/58   03/27/25 (!) 147/79       # Mood: doing good  - has been gardening     Advance Care Planning    Document on file is a Health Care Directive or POLST.          5/15/2025   General Health   How would you rate your overall physical health? Good   Feel stress (tense, anxious, or unable to sleep) Not at all         5/15/2025   Nutrition   Diet: Regular (no restrictions)         5/15/2025   Exercise   Days per week of moderate/strenous exercise 5 days   Average minutes spent exercising at this level 50 min         5/15/2025   Social Factors   Frequency of gathering with friends or relatives Three times a week   Worry food won't last until get money to buy more Patient declined   Food not last or not have enough money for food? Patient declined   Do you have housing? (Housing is defined as stable permanent housing and does not include staying outside in a car, in a tent, in an abandoned building, in an overnight shelter, or Tourn[employer]ase-surfing.) Patient declined   Are you worried about losing your housing? Patient declined   Lack of transportation? Patient  declined   Unable to get utilities (heat,electricity)? Patient declined         5/15/2025   Fall Risk   Fallen 2 or more times in the past year? No    Trouble with walking or balance? No        Proxy-reported          5/15/2025   Activities of Daily Living- Home Safety   Needs help with the following daily activites None of the above   Safety concerns in the home Throw rugs in the hallway    No grab bars in the bathroom       Multiple values from one day are sorted in reverse-chronological order         5/15/2025   Dental   Dentist two times every year? (!) NO         5/15/2025   Hearing Screening   Hearing concerns? (!) IT'S HARD TO FOLLOW A CONVERSATION IN A NOISY RESTAURANT OR CROWDED ROOM.         5/15/2025   Driving Risk Screening   Patient/family members have concerns about driving No         5/15/2025   General Alertness/Fatigue Screening   Have you been more tired than usual lately? No         5/15/2025   Urinary Incontinence Screening   Bothered by leaking urine in past 6 months Yes         Today's PHQ-2 Score:       5/15/2025    10:23 AM   PHQ-2 ( 1999 Pfizer)   PHQ-2 Score Incomplete           5/15/2025   Substance Use   Alcohol more than 3/day or more than 7/wk No   Do you have a current opioid prescription? No   How severe/bad is pain from 1 to 10? 0/10 (No Pain)   Do you use any other substances recreationally? No     Social History     Tobacco Use    Smoking status: Never    Smokeless tobacco: Never   Substance Use Topics    Alcohol use: Not Currently     Comment: former    Drug use: Never           8/8/2024   LAST FHS-7 RESULTS   1st degree relative breast or ovarian cancer No   Any relative bilateral breast cancer No   Any male have breast cancer No   Any ONE woman have BOTH breast AND ovarian cancer No   Any woman with breast cancer before 50yrs No   2 or more relatives with breast AND/OR ovarian cancer No   2 or more relatives with breast AND/OR bowel cancer No       Mammogram Screening -  Mammogram every 1-2 years updated in Health Maintenance based on mutual decision making    ASCVD Risk   The 10-year ASCVD risk score (Umberto DK, et al., 2019) is: 12.1%    Values used to calculate the score:      Age: 75 years      Sex: Female      Is Non- : No      Diabetic: No      Tobacco smoker: No      Systolic Blood Pressure: 122 mmHg      Is BP treated: No      HDL Cholesterol: 52 mg/dL      Total Cholesterol: 209 mg/dL    # Wellness:  - Mammogram: due 8/2025 - order placed and dated appropriately   - Immunizations: tdap (pharm), COVID (declines)  - Lipids: updating today   - Dexa scan (9/21/2021): lowest T score of -2.0. major 9.7%. hip 1.6%. declines for now   - Colon cancer screening: cologuard (11/29/2023) negative  - Lung cancer screening:never smoker  - AAA screening:     # vitamin d  - supplements have vitamin d in them     Reviewed and updated as needed this visit by Provider   Tobacco  Allergies  Meds  Problems  Med Hx  Surg Hx  Fam Hx              Current providers sharing in care for this patient include:  Patient Care Team:  Kaylen Hawkins MD as PCP - General (Family Practice)  Kaylen Hawkins MD as Assigned PCP  Stanton Garcia RPH as Assigned MTM Pharmacist  Mavis Crowe DPM as Assigned Musculoskeletal Provider  Gonzalo Lin RPH as MTM Pharmacist  Gonzalo Lin RPH as Pharmacist (Pharmacist)    The following health maintenance items are reviewed in Epic and correct as of today:  Health Maintenance   Topic Date Due    HEPATITIS C SCREENING  Never done    RSV VACCINE (1 - Risk 60-74 years 1-dose series) Never done    DTAP/TDAP/TD IMMUNIZATION (3 - Td or Tdap) 11/23/2022    COVID-19 Vaccine (5 - 2024-25 season) 09/01/2024    LIPID  05/07/2025    MICROALBUMIN  05/07/2025    BMP  06/12/2025    MEDICARE ANNUAL WELLNESS VISIT  05/15/2026    FALL RISK ASSESSMENT  05/15/2026    HEMOGLOBIN  05/15/2026    MAMMO SCREENING  08/08/2026     "DEXA  09/21/2026    COLORECTAL CANCER SCREENING  11/29/2026    DIABETES SCREENING  06/12/2027    ADVANCE CARE PLANNING  05/07/2029    PHQ-2 (once per calendar year)  Completed    INFLUENZA VACCINE  Completed    Pneumococcal Vaccine: 50+ Years  Completed    URINALYSIS  Completed    ZOSTER IMMUNIZATION  Completed    HPV IMMUNIZATION  Aged Out    MENINGITIS IMMUNIZATION  Aged Out         Review of Systems  Constitutional, HEENT, cardiovascular, pulmonary, gi and gu systems are negative, except as otherwise noted.     Objective    Exam  /58   Pulse 95   Temp 98.4  F (36.9  C) (Tympanic)   Resp 16   Ht 1.702 m (5' 7\")   Wt 68.9 kg (152 lb)   SpO2 96%   BMI 23.81 kg/m     Estimated body mass index is 23.81 kg/m  as calculated from the following:    Height as of this encounter: 1.702 m (5' 7\").    Weight as of this encounter: 68.9 kg (152 lb).    Physical Exam  Constitutional:       General: She is not in acute distress.     Appearance: She is well-developed.   HENT:      Head: Normocephalic and atraumatic.      Right Ear: Hearing and tympanic membrane normal.      Left Ear: Hearing and tympanic membrane normal.      Mouth/Throat:      Mouth: Mucous membranes are moist.      Pharynx: No oropharyngeal exudate.   Eyes:      Extraocular Movements: Extraocular movements intact.      Conjunctiva/sclera: Conjunctivae normal.   Neck:      Thyroid: No thyromegaly.   Cardiovascular:      Rate and Rhythm: Normal rate and regular rhythm.      Pulses: Normal pulses.      Heart sounds: Normal heart sounds. No murmur heard.  Pulmonary:      Effort: Pulmonary effort is normal. No respiratory distress.      Breath sounds: Normal breath sounds. No wheezing or rales.   Abdominal:      General: Bowel sounds are normal. There is no distension.      Palpations: Abdomen is soft.      Tenderness: There is no abdominal tenderness. There is no guarding.   Musculoskeletal:         General: Normal range of motion.      Cervical back: " Normal range of motion and neck supple.      Right lower leg: No edema.      Left lower leg: No edema.   Lymphadenopathy:      Cervical: No cervical adenopathy.   Skin:     General: Skin is dry.   Neurological:      Mental Status: She is alert.   Psychiatric:         Mood and Affect: Mood normal.       Results for orders placed or performed in visit on 05/15/25   Lipid Profile     Status: Abnormal   Result Value Ref Range    Cholesterol 215 (H) <200 mg/dL    Triglycerides 114 <150 mg/dL    Direct Measure HDL 49 (L) >=50 mg/dL    LDL Cholesterol Calculated 143 (H) <100 mg/dL    Non HDL Cholesterol 166 (H) <130 mg/dL    Patient Fasting > 8hrs? Yes     Narrative    Cholesterol  Desirable: < 200 mg/dL  Borderline High: 200 - 239 mg/dL  High: >= 240 mg/dL    Triglycerides  Normal: < 150 mg/dL  Borderline High: 150 - 199 mg/dL  High: 200-499 mg/dL  Very High: >= 500 mg/dL    Direct Measure HDL  Female: >= 50 mg/dL   Male: >= 40 mg/dL    LDL Cholesterol  Desirable: < 100 mg/dL  Above Desirable: 100 - 129 mg/dL   Borderline High: 130 - 159 mg/dL   High:  160 - 189 mg/dL   Very High: >= 190 mg/dL    Non HDL Cholesterol  Desirable: < 130 mg/dL  Above Desirable: 130 - 159 mg/dL  Borderline High: 160 - 189 mg/dL  High: 190 - 219 mg/dL  Very High: >= 220 mg/dL   CBC with platelets     Status: Normal   Result Value Ref Range    WBC Count 6.7 4.0 - 11.0 10e3/uL    RBC Count 4.18 3.80 - 5.20 10e6/uL    Hemoglobin 13.0 11.7 - 15.7 g/dL    Hematocrit 38.5 35.0 - 47.0 %    MCV 92 78 - 100 fL    MCH 31.1 26.5 - 33.0 pg    MCHC 33.8 31.5 - 36.5 g/dL    RDW 11.9 10.0 - 15.0 %    Platelet Count 285 150 - 450 10e3/uL   Comprehensive metabolic panel     Status: Abnormal   Result Value Ref Range    Sodium 142 135 - 145 mmol/L    Potassium 4.0 3.4 - 5.3 mmol/L    Carbon Dioxide (CO2) 24 22 - 29 mmol/L    Anion Gap 11 7 - 15 mmol/L    Urea Nitrogen 23.0 8.0 - 23.0 mg/dL    Creatinine 0.96 (H) 0.51 - 0.95 mg/dL    GFR Estimate 62 >60  mL/min/1.73m2    Calcium 9.4 8.8 - 10.4 mg/dL    Chloride 107 98 - 107 mmol/L    Glucose 116 (H) 70 - 99 mg/dL    Alkaline Phosphatase 103 40 - 150 U/L    AST 26 0 - 45 U/L    ALT 37 0 - 50 U/L    Protein Total 6.9 6.4 - 8.3 g/dL    Albumin 4.4 3.5 - 5.2 g/dL    Bilirubin Total 0.5 <=1.2 mg/dL    Patient Fasting > 8hrs? Yes              5/15/2025   Mini Cog   Clock Draw Score 0 Abnormal   3 Item Recall 2 objects recalled   Mini Cog Total Score 2     - clock is correct on second try              Signed Electronically by: Kaylen Hawkins MD

## 2025-05-16 NOTE — RESULT ENCOUNTER NOTE
She does not want to start a Statin at this time  Patient notified of results.  Merle Matamoros LPN

## 2025-05-22 ENCOUNTER — TRANSFERRED RECORDS (OUTPATIENT)
Dept: HEALTH INFORMATION MANAGEMENT | Facility: CLINIC | Age: 75
End: 2025-05-22

## 2025-06-03 ENCOUNTER — OFFICE VISIT (OUTPATIENT)
Dept: PODIATRY | Facility: OTHER | Age: 75
End: 2025-06-03
Attending: PODIATRIST
Payer: COMMERCIAL

## 2025-06-03 VITALS
HEART RATE: 67 BPM | SYSTOLIC BLOOD PRESSURE: 162 MMHG | OXYGEN SATURATION: 97 % | TEMPERATURE: 97.6 F | DIASTOLIC BLOOD PRESSURE: 78 MMHG

## 2025-06-03 DIAGNOSIS — M76.72 PERONEAL TENDINITIS OF LEFT LOWER EXTREMITY: ICD-10-CM

## 2025-06-03 DIAGNOSIS — L60.3 ONYCHODYSTROPHY: ICD-10-CM

## 2025-06-03 DIAGNOSIS — N18.31 STAGE 3A CHRONIC KIDNEY DISEASE (H): Primary | ICD-10-CM

## 2025-06-03 PROCEDURE — 11721 DEBRIDE NAIL 6 OR MORE: CPT | Performed by: PODIATRIST

## 2025-06-03 PROCEDURE — G0463 HOSPITAL OUTPT CLINIC VISIT: HCPCS | Mod: 25

## 2025-06-03 ASSESSMENT — PAIN SCALES - GENERAL: PAINLEVEL_OUTOF10: NO PAIN (0)

## 2025-06-03 NOTE — PROGRESS NOTES
Chief complaint: Patient presents with:  Toenail: trimming      History of Present Illness: This 75 year old female with CKD IIIa is seen for follow-up management of peroneal pain and for high risk nail debridement and for follow-up management of the bilateral foot pain.    Her nails are thick and difficult for her to trim so she would like the nails debrided today.    She has been going through physical therapy for her bilateral foot pain. The RIGHT foot pain is greatly improved and she only has mild pain remaining in the LEFT foot. She says her physical therapist told her to remove the orthotics in her shoes. She says the correction was too much. She says the pain is improving. She also wears arch binders which reduces her pain.    No further pedal complaints today.       BP (!) 162/78 (BP Location: Left arm, Patient Position: Sitting, Cuff Size: Adult Regular)   Pulse 67   Temp 97.6  F (36.4  C) (Tympanic)   SpO2 97%      Patient Active Problem List   Diagnosis    HCD (health care directive)    Family history of heart disease    Lumbago    Osteoarthrosis involving lower leg    Plantar fasciitis    Abnormal stress test on 11/27/2019    Other chest pain    Mixed hyperlipidemia    Elevated coronary artery calcium score on 11/22/2019    Right lower lobe pulmonary nodule - stable for 4 years. no further imaging required    Left bundle branch block (LBBB)    Coronary artery disease involving native coronary artery of native heart without angina pectoris    H/O cardiac catheterization at [facility] on 8/24/2021. LAD with 40%    Chronic kidney disease, stage 3 (H)    Benign essential hypertension    Restless leg syndrome       Past Surgical History:   Procedure Laterality Date    APPENDECTOMY  1964    ARTHROSCOPY KNEE Left 1989    HYSTERECTOMY  1974    HYSTERECTOMY TOTAL ABDOMINAL  2001    TONSILLECTOMY  1954       Current Outpatient Medications   Medication Sig Dispense Refill    B Complex-Biotin-FA (SUPER B-COMPLEX)  TABS Take 1 tablet by mouth daily      diclofenac (VOLTAREN) 1 % topical gel Apply 2 g topically 4 times daily as needed for moderate pain 350 g 1    ELDERBERRY PO Take 50 mg by mouth daily as needed Seasonally (September to April)      Ginger, Zingiber officinalis, (GINGER ROOT) 550 MG CAPS capsule Take 550 mg by mouth daily      l-lysine HCl 500 MG TABS tablet Take 500 mg by mouth daily      Multiple Minerals-Vitamins (CALCIUM-MAGNESIUM-ZINC-D3 PO) Take 1 tablet by mouth daily      Multiple Vitamins-Minerals (HAIR SKIN & NAILS PO) Take 2 chew tab by mouth daily      Omega-3 Fatty Acids (FISH OIL PO) Take 1 capsule by mouth daily 500mg fish oil/krill oil blend, 339 mg omega-3 fatty acids, 314 mg EPA and DHA      potassium 99 MG TABS Take 1 tablet by mouth daily      Turmeric (QC TUMERIC COMPLEX PO) Take 1 capsule by mouth daily Turmeric 750 mg, ginger root powder 50 mg, black pepper root extract 5 mg      UNABLE TO FIND 2 drops MEDICATION NAME: Calea - 1 dropper daily.       No current facility-administered medications for this visit.          Allergies   Allergen Reactions    Bee Venom      unknown reaction    Iodine      unknown reaction    Penicillins      Childhood    Etodolac Rash       Family History   Problem Relation Age of Onset    Myocardial Infarction Mother     Alcoholism Father         Shot deer hunting    Mental Illness Brother     Myocardial Infarction Brother     Myocardial Infarction Brother     Myocardial Infarction Brother     Myocardial Infarction Brother        Social History     Socioeconomic History    Marital status:      Spouse name: None    Number of children: 3    Years of education: None    Highest education level: None   Tobacco Use    Smoking status: Never    Smokeless tobacco: Never   Substance and Sexual Activity    Alcohol use: Not Currently     Comment: former    Drug use: Never     Social Determinants of Health     Financial Resource Strain: Low Risk  (5/7/2024)     Financial Resource Strain     Within the past 12 months, have you or your family members you live with been unable to get utilities (heat, electricity) when it was really needed?: No   Food Insecurity: Low Risk  (5/7/2024)    Food Insecurity     Within the past 12 months, did you worry that your food would run out before you got money to buy more?: No     Within the past 12 months, did the food you bought just not last and you didn t have money to get more?: No   Transportation Needs: Low Risk  (5/7/2024)    Transportation Needs     Within the past 12 months, has lack of transportation kept you from medical appointments, getting your medicines, non-medical meetings or appointments, work, or from getting things that you need?: No   Physical Activity: Unknown (5/7/2024)    Exercise Vital Sign     Days of Exercise per Week: 4 days   Stress: Stress Concern Present (5/7/2024)    [employer] [place] [employer] - Occupational Stress Questionnaire     Feeling of Stress : To some extent   Social Connections: Unknown (5/7/2024)    Social Connection and Isolation Panel [NHANES]     Frequency of Social Gatherings with Friends and Family: Once a week   Interpersonal Safety: Low Risk  (9/11/2024)    Interpersonal Safety     Do you feel physically and emotionally safe where you currently live?: Yes     Within the past 12 months, have you been hit, slapped, kicked or otherwise physically hurt by someone?: No     Within the past 12 months, have you been humiliated or emotionally abused in other ways by your partner or ex-partner?: No   Housing Stability: Low Risk  (5/7/2024)    Housing Stability     Do you have housing? : Yes     Are you worried about losing your housing?: No       ROS: 10 point ROS neg other than the symptoms noted above in the HPI.  EXAM  Constitutional: healthy, alert, and no distress    Psychiatric: mentation appears normal and affect normal/bright    VASCULAR:  -Dorsalis pedis pulse +2/4  b/l  -Posterior tibial pulse +2/4 b/l  -Capillary refill time < 3 seconds to b/l hallux  -Hair growth Present to b/l anterior legs and ankles  NEURO:  -Light touch sensation intact to b/l plantar forefoot  DERM:  -Skin temperature, texture and turgor WNL b/l  -Toenails elongated, thickened, dystrophic and discolored x 10  MSK:  -Pain on palpation to the lateral rim and the medial rim of the middle heel at the plantar margin, LEFT (RIGHT is resolved)  -Mild tenderness remaining on the RIGHT lateral ankle  -Muscle strength of ankles +5/5 for dorsiflexion, plantarflexion, ABDUction and ADDuction b/l    -Ankle joint passive ROM within normal limits except for dorsiflexion:    Dorsiflexion, RIGHT Straight knee 0 degrees    Dorsiflexion, LEFT Straight knee 0 degrees        LEFT FOOT RADIOGRAPHS 03/27/2025  IMPRESSION:  No acute osseous abnormality.  JACKELINE DUNBAR MD     RIGHT FOOT RADIOGRAPHS 03/27/2025  IMPRESSION:  No acute osseous abnormality.  JACKELINE DUNBAR MD   ============================================================    ASSESSMENT:    (N18.31) Stage 3a chronic kidney disease (H)  (primary encounter diagnosis)    (L60.3) Onychodystrophy    (M76.72) Peroneal tendinitis of left lower extremity        PLAN:  -Patient evaluated and examined. Treatment options discussed with no educational barriers noted.    Peroneal tendonitis / heel pain / trigger point or myositis pain of bilateral legs:    -The pain has greatly improved. Continue PT (Friday, 06/06/2025) and she is thinking this may be her last visit. Continue with PT stretches and continue for at least six months at home.  -Continue wearing supportive shoes all day every day.  -Call the clinic if pain worsens.    -----------------------------------------------------------    -Toenail debridement x 10 toenails without incident    CKD: Patient's CKDis managed by their PCP. The kidney function appears to be stable. Patient's last GFR was 62 on 05/15/2025 (increased from  56 on 06/12/2024). The reduced kidney function contributes to increased edema in the foot.     GFR Estimate   Date Value Ref Range Status   05/15/2025 62 >60 mL/min/1.73m2 Final     Comment:     eGFR calculated using 2021 CKD-EPI equation.   06/17/2021 57 (L) >60 mL/min/[1.73_m2] Final     Comment:     Non  GFR Calc  Starting 12/18/2018, serum creatinine based estimated GFR (eGFR) will be   calculated using the Chronic Kidney Disease Epidemiology Collaboration   (CKD-EPI) equation.           -Foot Education provided. This included checking the feet daily looking for new new blisters or wounds, wearing shoes at all times when walking including around the house, and avoiding lotion application between the toes. If there are any signs of infection, the patient should present to the ED as soon as possible. Infections of the foot can be life threatening or lead to amputations of the foot or leg.    -Patient in agreement with the above treatment plan and all of patient's questions were answered.        Return to clinic 63+ days for toenail debridement and to evaluate bilateral heel pain and leg pain post physical therapy        Mavis Crowe DPM

## 2025-08-05 ENCOUNTER — OFFICE VISIT (OUTPATIENT)
Dept: PODIATRY | Facility: OTHER | Age: 75
End: 2025-08-05
Attending: PODIATRIST
Payer: COMMERCIAL

## 2025-08-05 VITALS
HEART RATE: 64 BPM | OXYGEN SATURATION: 95 % | RESPIRATION RATE: 16 BRPM | DIASTOLIC BLOOD PRESSURE: 77 MMHG | TEMPERATURE: 97.9 F | SYSTOLIC BLOOD PRESSURE: 151 MMHG

## 2025-08-05 DIAGNOSIS — I83.892 VARICOSE VEINS OF LEFT LOWER EXTREMITY WITH EDEMA: ICD-10-CM

## 2025-08-05 DIAGNOSIS — N18.31 STAGE 3A CHRONIC KIDNEY DISEASE (H): Primary | ICD-10-CM

## 2025-08-05 DIAGNOSIS — L60.3 ONYCHODYSTROPHY: ICD-10-CM

## 2025-08-05 PROCEDURE — 11721 DEBRIDE NAIL 6 OR MORE: CPT | Performed by: PODIATRIST

## 2025-08-05 PROCEDURE — G0463 HOSPITAL OUTPT CLINIC VISIT: HCPCS

## 2025-08-05 ASSESSMENT — PAIN SCALES - GENERAL: PAINLEVEL_OUTOF10: NO PAIN (0)

## 2025-08-11 ENCOUNTER — TELEPHONE (OUTPATIENT)
Dept: MAMMOGRAPHY | Facility: HOSPITAL | Age: 75
End: 2025-08-11

## 2025-08-11 ENCOUNTER — ANCILLARY PROCEDURE (OUTPATIENT)
Dept: MAMMOGRAPHY | Facility: OTHER | Age: 75
End: 2025-08-11
Attending: FAMILY MEDICINE
Payer: COMMERCIAL

## 2025-08-11 DIAGNOSIS — Z12.31 ENCOUNTER FOR SCREENING MAMMOGRAM FOR BREAST CANCER: ICD-10-CM

## 2025-08-11 PROCEDURE — 77063 BREAST TOMOSYNTHESIS BI: CPT | Mod: 26 | Performed by: RADIOLOGY

## 2025-08-11 PROCEDURE — 77063 BREAST TOMOSYNTHESIS BI: CPT | Mod: TC

## 2025-08-11 PROCEDURE — 77067 SCR MAMMO BI INCL CAD: CPT | Mod: 26 | Performed by: RADIOLOGY
